# Patient Record
Sex: FEMALE | Race: WHITE | NOT HISPANIC OR LATINO | Employment: OTHER | URBAN - METROPOLITAN AREA
[De-identification: names, ages, dates, MRNs, and addresses within clinical notes are randomized per-mention and may not be internally consistent; named-entity substitution may affect disease eponyms.]

---

## 2017-12-08 ENCOUNTER — OFFICE VISIT (OUTPATIENT)
Dept: FAMILY MEDICINE CLINIC | Facility: CLINIC | Age: 72
End: 2017-12-08

## 2017-12-08 VITALS
OXYGEN SATURATION: 100 % | WEIGHT: 129 LBS | DIASTOLIC BLOOD PRESSURE: 65 MMHG | TEMPERATURE: 97.8 F | BODY MASS INDEX: 21.49 KG/M2 | HEIGHT: 65 IN | SYSTOLIC BLOOD PRESSURE: 140 MMHG | HEART RATE: 69 BPM

## 2017-12-08 DIAGNOSIS — K59.04 CHRONIC IDIOPATHIC CONSTIPATION: Primary | ICD-10-CM

## 2017-12-08 DIAGNOSIS — Z86.19 HISTORY OF HEPATITIS: ICD-10-CM

## 2017-12-08 PROBLEM — K59.00 CONSTIPATION: Status: ACTIVE | Noted: 2017-12-05

## 2017-12-08 PROCEDURE — 99203 OFFICE O/P NEW LOW 30 MIN: CPT | Performed by: FAMILY MEDICINE

## 2017-12-08 RX ORDER — ESTRADIOL AND NORETHINDRONE ACETATE 1; .5 MG/1; MG/1
1 TABLET ORAL
COMMUNITY
End: 2019-03-06 | Stop reason: SDUPTHER

## 2017-12-08 NOTE — PROGRESS NOTES
Subjective   Denisse Marino is a 72 y.o. female. Pt is presenting to [facility]. She was previously seen by Dr. Kirkland who has transitioned to [facility]. Her chief complaint is constipation and history of unknown type of hepatitis.    Chief Complaint   Patient presents with   • Constipation     routine follow up        History of Present Illness    Constipation  Chronic problem for years. She has had colonoscopy without cause. She has hard, dry, pellet like BMs every 3-4 days. Seems to be worse in the winter, feels she is overall more sedentary in the winter and her diet is not as good. Little better in the warm months as she is very active and eats more produce. Has tried Citracel routinely and eating bran cereal. Actually feels like these interventions make it worse. She is conscientious to stay well hydrated with water, does not drink milk. Saw physician and received a sample of Linzess but does not feel this has helped yet. Pt is also not interested in taking prescription pills in general.    History of hepatitis infection at age 11 years.   Pt reported that she was diagnosed with hepatitis at that same time as her brother, and her mother had hepatitis but was much sicker and had to be hospitalized for treatment. Had another sister and brother who were not affected. Pt is unsure of the treatment but does remember having to return to the hospital regularly for blood draws/testing. She thought she had to be on a strict diet to avoid sugars. Does not think that she was vaccinated against Hepatitis but unsure. Not aware if prior PCP tested her for any hepatitis, she did just discuss this history with PCP at most recent visit this August. Was scheduled for follow up labs but this was cancelled because PCP transitioned to [facility] and pt not following. Denied abdominal pain, scleral icterus, abdominal distention/fluid on belly.       The following portions of the patient's history were reviewed and updated as  "appropriate: allergies, current medications, past family history, past medical history, past social history, past surgical history and problem list.      Review of Systems   Constitutional: Negative for activity change, appetite change and unexpected weight change.   HENT: Negative for congestion, rhinorrhea and sinus pressure.    Respiratory: Negative for shortness of breath.    Cardiovascular: Negative for chest pain and leg swelling.   Gastrointestinal: Positive for constipation. Negative for abdominal distention, abdominal pain, blood in stool, diarrhea, nausea and vomiting.   Genitourinary: Negative for difficulty urinating, dysuria, frequency and urgency.   Psychiatric/Behavioral: Negative for dysphoric mood.       Objective   Blood pressure 140/65, pulse 69, temperature 97.8 °F (36.6 °C), height 165.1 cm (65\"), weight 58.5 kg (129 lb), SpO2 100 %.  Physical Exam   Constitutional: She is oriented to person, place, and time. She appears well-nourished. No distress.   HENT:   Right Ear: External ear normal.   Left Ear: External ear normal.   Nose: Nose normal.   Mouth/Throat: Oropharynx is clear and moist. No oropharyngeal exudate.   Bilateral ear canals and tympanic membranes appear normal.   Eyes: Conjunctivae and EOM are normal. Right eye exhibits no discharge. Left eye exhibits no discharge. No scleral icterus.   Neck: Neck supple. No thyromegaly present.   Cardiovascular: Normal rate, regular rhythm, normal heart sounds and intact distal pulses.  Exam reveals no gallop and no friction rub.    No murmur heard.  Pulmonary/Chest: Effort normal and breath sounds normal. No respiratory distress. She has no wheezes. She has no rales.   Abdominal: Soft. Bowel sounds are normal. She exhibits no distension and no mass. There is no tenderness. There is no guarding.   No organomegaly   Musculoskeletal: She exhibits no edema or deformity.   Lymphadenopathy:     She has no cervical adenopathy.   Neurological: She is " alert and oriented to person, place, and time. She exhibits normal muscle tone. Coordination normal.   Skin: Skin is warm and dry.   Psychiatric: She has a normal mood and affect. Her behavior is normal.   Vitals reviewed.      Assessment/Plan   Denisse was seen today for constipation.    Diagnoses and all orders for this visit:    Chronic idiopathic constipation    History of hepatitis    Constipation  Request records from PCP to see when had c-scope and results  Chronic problem  Discussed constipation, colon distention, dysfunction of peristalsis need for chronic treatment of chronic problem  Has not tried Miralax consistently, discussed how to titrate, side effect of diarrhea, plenty of fluids    Hepatitis  Hx concerning for B or C  Will get records from PCP, if no record of testing told pt we would do antibody testing to make sure she has B immunity and no hep C  Overall pt without overt symptoms, hx of infection 61 years ago but will solidify with labs if not done, if she is a carrier possible risk to household members

## 2018-01-17 ENCOUNTER — OFFICE VISIT (OUTPATIENT)
Dept: FAMILY MEDICINE CLINIC | Facility: CLINIC | Age: 73
End: 2018-01-17

## 2018-01-17 VITALS
WEIGHT: 130 LBS | HEART RATE: 93 BPM | BODY MASS INDEX: 21.66 KG/M2 | SYSTOLIC BLOOD PRESSURE: 134 MMHG | OXYGEN SATURATION: 99 % | DIASTOLIC BLOOD PRESSURE: 78 MMHG | HEIGHT: 65 IN

## 2018-01-17 DIAGNOSIS — M54.16 LUMBAR BACK PAIN WITH RADICULOPATHY AFFECTING RIGHT LOWER EXTREMITY: Primary | ICD-10-CM

## 2018-01-17 PROCEDURE — 99213 OFFICE O/P EST LOW 20 MIN: CPT | Performed by: NURSE PRACTITIONER

## 2018-01-17 RX ORDER — METHYLPREDNISOLONE 4 MG/1
TABLET ORAL
Qty: 21 TABLET | Refills: 0 | Status: SHIPPED | OUTPATIENT
Start: 2018-01-17 | End: 2018-01-22 | Stop reason: SDUPTHER

## 2018-01-17 NOTE — PATIENT INSTRUCTIONS
Sciatica  Sciatica is pain, numbness, weakness, or tingling along the path of the sciatic nerve. The sciatic nerve starts in the lower back and runs down the back of each leg. The nerve controls the muscles in the lower leg and in the back of the knee. It also provides feeling (sensation) to the back of the thigh, the lower leg, and the sole of the foot. Sciatica is a symptom of another medical condition that pinches or puts pressure on the sciatic nerve.  Generally, sciatica only affects one side of the body. Sciatica usually goes away on its own or with treatment. In some cases, sciatica may keep coming back (recur).  What are the causes?  This condition is caused by pressure on the sciatic nerve, or pinching of the sciatic nerve. This may be the result of:  · A disk in between the bones of the spine (vertebrae) bulging out too far (herniated disk).  · Age-related changes in the spinal disks (degenerative disk disease).  · A pain disorder that affects a muscle in the buttock (piriformis syndrome).  · Extra bone growth (bone spur) near the sciatic nerve.  · An injury or break (fracture) of the pelvis.  · Pregnancy.  · Tumor (rare).  What increases the risk?  The following factors may make you more likely to develop this condition:  · Playing sports that place pressure or stress on the spine, such as football or weight lifting.  · Having poor strength and flexibility.  · A history of back injury.  · A history of back surgery.  · Sitting for long periods of time.  · Doing activities that involve repetitive bending or lifting.  · Obesity.  What are the signs or symptoms?  Symptoms can vary from mild to very severe, and they may include:  · Any of these problems in the lower back, leg, hip, or buttock:  ¨ Mild tingling or dull aches.  ¨ Burning sensations.  ¨ Sharp pains.  · Numbness in the back of the calf or the sole of the foot.  · Leg weakness.  · Severe back pain that makes movement difficult.  These symptoms may  get worse when you cough, sneeze, or laugh, or when you sit or stand for long periods of time. Being overweight may also make symptoms worse. In some cases, symptoms may recur over time.  How is this diagnosed?  This condition may be diagnosed based on:  · Your symptoms.  · A physical exam. Your health care provider may ask you to do certain movements to check whether those movements trigger your symptoms.  · You may have tests, including:  ¨ Blood tests.  ¨ X-rays.  ¨ MRI.  ¨ CT scan.  How is this treated?  In many cases, this condition improves on its own, without any treatment. However, treatment may include:  · Reducing or modifying physical activity during periods of pain.  · Exercising and stretching to strengthen your abdomen and improve the flexibility of your spine.  · Icing and applying heat to the affected area.  · Medicines that help:  ¨ To relieve pain and swelling.  ¨ To relax your muscles.  · Injections of medicines that help to relieve pain, irritation, and inflammation around the sciatic nerve (steroids).  · Surgery.  Follow these instructions at home:  Medicines  · Take over-the-counter and prescription medicines only as told by your health care provider.  · Do not drive or operate heavy machinery while taking prescription pain medicine.  Managing pain  · If directed, apply ice to the affected area.  ¨ Put ice in a plastic bag.  ¨ Place a towel between your skin and the bag.  ¨ Leave the ice on for 20 minutes, 2-3 times a day.  · After icing, apply heat to the affected area before you exercise or as often as told by your health care provider. Use the heat source that your health care provider recommends, such as a moist heat pack or a heating pad.  ¨ Place a towel between your skin and the heat source.  ¨ Leave the heat on for 20-30 minutes.  ¨ Remove the heat if your skin turns bright red. This is especially important if you are unable to feel pain, heat, or cold. You may have a greater risk of  getting burned.  Activity  · Return to your normal activities as told by your health care provider. Ask your health care provider what activities are safe for you.  ¨ Avoid activities that make your symptoms worse.  · Take brief periods of rest throughout the day. Resting in a lying or standing position is usually better than sitting to rest.  ¨ When you rest for longer periods, mix in some mild activity or stretching between periods of rest. This will help to prevent stiffness and pain.  ¨ Avoid sitting for long periods of time without moving. Get up and move around at least one time each hour.  · Exercise and stretch regularly, as told by your health care provider.  · Do not lift anything that is heavier than 10 lb (4.5 kg) while you have symptoms of sciatica. When you do not have symptoms, you should still avoid heavy lifting, especially repetitive heavy lifting.  · When you lift objects, always use proper lifting technique, which includes:  ¨ Bending your knees.  ¨ Keeping the load close to your body.  ¨ Avoiding twisting.  General instructions  · Use good posture.  ¨ Avoid leaning forward while sitting.  ¨ Avoid hunching over while standing.  · Maintain a healthy weight. Excess weight puts extra stress on your back and makes it difficult to maintain good posture.  · Wear supportive, comfortable shoes. Avoid wearing high heels.  · Avoid sleeping on a mattress that is too soft or too hard. A mattress that is firm enough to support your back when you sleep may help to reduce your pain.  · Keep all follow-up visits as told by your health care provider. This is important.  Contact a health care provider if:  · You have pain that wakes you up when you are sleeping.  · You have pain that gets worse when you lie down.  · Your pain is worse than you have experienced in the past.  · Your pain lasts longer than 4 weeks.  · You experience unexplained weight loss.  Get help right away if:  · You lose control of your bowel  or bladder (incontinence).  · You have:  ¨ Weakness in your lower back, pelvis, buttocks, or legs that gets worse.  ¨ Redness or swelling of your back.  ¨ A burning sensation when you urinate.  This information is not intended to replace advice given to you by your health care provider. Make sure you discuss any questions you have with your health care provider.  Document Released: 12/12/2002 Document Revised: 05/23/2017 Document Reviewed: 08/26/2016  Elsevier Interactive Patient Education © 2017 [employer]

## 2018-01-17 NOTE — PROGRESS NOTES
Subjective   Denisse Marino is a 72 y.o. female presents with two day history of lower back pain on right side radiating into buttock and right thigh. States right thigh is numb. Continuously numb with no improvement despite positional changes. Pain is currently 6/10, last night 8/10. Had previous pain in lower back, treated with steroid and noticed improvement. No known injury    Back Pain   This is a new problem. The current episode started in the past 7 days. The problem occurs daily. The problem has been waxing and waning since onset. The pain is present in the lumbar spine and gluteal. The quality of the pain is described as aching. The pain radiates to the right thigh. The pain is at a severity of 6/10. The pain is moderate. The pain is worse during the day. The symptoms are aggravated by sitting. Stiffness is present in the morning. Associated symptoms include leg pain (numbness). Pertinent negatives include no abdominal pain, bladder incontinence, bowel incontinence, chest pain, dysuria, fever, headaches, numbness, paresis, paresthesias, pelvic pain, perianal numbness, tingling, weakness or weight loss. She has tried NSAIDs and analgesics for the symptoms. The treatment provided mild relief.        The following portions of the patient's history were reviewed and updated as appropriate: allergies, current medications, past family history, past medical history, past social history, past surgical history and problem list.    Review of Systems   Constitutional: Positive for activity change (decreased secondary to back pain). Negative for fever and weight loss.   Cardiovascular: Negative for chest pain.   Gastrointestinal: Negative for abdominal pain and bowel incontinence.   Genitourinary: Negative for bladder incontinence, dysuria and pelvic pain.   Musculoskeletal: Positive for back pain (lower back pain/buttock, right sided, radiating into right thigh, ). Negative for gait problem, joint swelling,  myalgias, neck pain and neck stiffness.   Neurological: Negative for tingling, weakness, numbness, headaches and paresthesias.       Objective   Physical Exam   Constitutional: She is oriented to person, place, and time. She appears well-developed and well-nourished.   Cardiovascular: Normal rate, regular rhythm and normal heart sounds.  Exam reveals no gallop and no friction rub.    No murmur heard.  Pulmonary/Chest: Effort normal and breath sounds normal. No respiratory distress. She has no wheezes. She has no rales.   Musculoskeletal:        Lumbar back: She exhibits normal range of motion, no tenderness, no bony tenderness, no swelling, no edema, no deformity, no laceration, no pain and no spasm.   Denies pain with palpation, no erythema or edema, numbness noted in right thigh, does not extend to knee     Neurological: She is alert and oriented to person, place, and time.   Skin: Skin is warm and dry.   Psychiatric: She has a normal mood and affect.   Vitals reviewed.      Assessment/Plan   Denisse was seen today for back pain.    Diagnoses and all orders for this visit:    Lumbar back pain with radiculopathy affecting right lower extremity  -     Ambulatory Referral to Physical Therapy Evaluate and treat    Other orders  -     MethylPREDNISolone (MEDROL, HALEIGH,) 4 MG tablet; Take as directed on package instructions.        For continued symptoms, will likely need CT lumbar spine. Patient agreeable with recommendations.  Will start steroid and try physical therapy to relieve symptoms.

## 2018-01-18 ENCOUNTER — TELEPHONE (OUTPATIENT)
Dept: FAMILY MEDICINE CLINIC | Facility: CLINIC | Age: 73
End: 2018-01-18

## 2018-01-18 NOTE — TELEPHONE ENCOUNTER
Yi from physical therapy called and they need an order for pt. She has an appointment tomorrow. Their fax number [phone].

## 2018-01-22 ENCOUNTER — TELEPHONE (OUTPATIENT)
Dept: FAMILY MEDICINE CLINIC | Facility: CLINIC | Age: 73
End: 2018-01-22

## 2018-01-22 RX ORDER — METHYLPREDNISOLONE 4 MG/1
TABLET ORAL
Qty: 21 TABLET | Refills: 0 | Status: SHIPPED | OUTPATIENT
Start: 2018-01-22 | End: 2018-01-26

## 2018-01-22 NOTE — TELEPHONE ENCOUNTER
Pt called and said that she saw you on the 17th and you prescribed her a medrol dose meghna. She said that she is on her last day of the dose pack and said that she is in a lot of pain. She said that the dose meghna helped for a little while and now her is hurting again. She is doing physical therapy. She wasn't sure if she needs to see you again or if we need to refill her dose pack?

## 2018-01-23 ENCOUNTER — TELEPHONE (OUTPATIENT)
Dept: FAMILY MEDICINE CLINIC | Facility: CLINIC | Age: 73
End: 2018-01-23

## 2018-01-23 NOTE — TELEPHONE ENCOUNTER
[facility] called wanting to speak to a pysician in regards to the patient's well being.

## 2018-01-24 ENCOUNTER — TELEPHONE (OUTPATIENT)
Dept: FAMILY MEDICINE CLINIC | Facility: CLINIC | Age: 73
End: 2018-01-24

## 2018-01-24 NOTE — TELEPHONE ENCOUNTER
Patient called stating that her physical therapist Carlo Nunez called yesterday to discuss her pain and possibly get an order for an MRI.The patient is still in pain

## 2018-01-25 ENCOUNTER — TELEPHONE (OUTPATIENT)
Dept: FAMILY MEDICINE CLINIC | Facility: CLINIC | Age: 73
End: 2018-01-25

## 2018-01-25 NOTE — TELEPHONE ENCOUNTER
Spoke with patient's physical therapist who has concern about her persistent radicular symptoms.  She presented with SI joint symptoms and sciatica.  However they have worked very hard on her SI symptoms which have improved.  He reported that her turn out is better in the SI symptoms resolved however the radicular symptoms are persisting and go to the knee.  She describes deadness and tingling.  He reports that her reflexes are still intact and normal and she is not having any weakness.  She has had no imaging in her chart to date.  Would be appropriate at this time to call the patient in for L-spine x-ray and from there likely MRI.

## 2018-01-26 ENCOUNTER — OFFICE VISIT (OUTPATIENT)
Dept: SPORTS MEDICINE | Facility: CLINIC | Age: 73
End: 2018-01-26

## 2018-01-26 ENCOUNTER — TELEPHONE (OUTPATIENT)
Dept: SPORTS MEDICINE | Facility: CLINIC | Age: 73
End: 2018-01-26

## 2018-01-26 VITALS
WEIGHT: 128 LBS | HEIGHT: 65 IN | DIASTOLIC BLOOD PRESSURE: 68 MMHG | SYSTOLIC BLOOD PRESSURE: 116 MMHG | BODY MASS INDEX: 21.33 KG/M2

## 2018-01-26 DIAGNOSIS — M54.41 ACUTE RIGHT-SIDED LOW BACK PAIN WITH RIGHT-SIDED SCIATICA: Primary | ICD-10-CM

## 2018-01-26 DIAGNOSIS — M25.551 PAIN OF RIGHT HIP JOINT: ICD-10-CM

## 2018-01-26 DIAGNOSIS — M54.16 RIGHT LUMBAR RADICULITIS: ICD-10-CM

## 2018-01-26 PROCEDURE — 99214 OFFICE O/P EST MOD 30 MIN: CPT | Performed by: FAMILY MEDICINE

## 2018-01-26 PROCEDURE — 72170 X-RAY EXAM OF PELVIS: CPT | Performed by: FAMILY MEDICINE

## 2018-01-26 PROCEDURE — 72100 X-RAY EXAM L-S SPINE 2/3 VWS: CPT | Performed by: FAMILY MEDICINE

## 2018-01-26 RX ORDER — GABAPENTIN 100 MG/1
CAPSULE ORAL
Qty: 60 CAPSULE | Refills: 1 | Status: SHIPPED | OUTPATIENT
Start: 2018-01-26 | End: 2018-01-31 | Stop reason: SDUPTHER

## 2018-01-26 RX ORDER — PREDNISONE 20 MG/1
TABLET ORAL
Qty: 20 TABLET | Refills: 0 | Status: SHIPPED | OUTPATIENT
Start: 2018-01-26 | End: 2018-02-21

## 2018-01-26 NOTE — TELEPHONE ENCOUNTER
CALLED IN GABAPENTIN TO [facility] IN [facility]

## 2018-01-26 NOTE — PROGRESS NOTES
"Denisse is a 72 y.o. year old female    Chief Complaint   Patient presents with   • Back Pain     Right side x1 week   • Leg Pain       History of Present Illness  HPI   Here today for low back pain, referred by Dr. Bocanegra. Started about 2 weeks ago with tingling in the right leg, but then worsened to include LBP and numbness/pain in the right thigh. Started physical therapy and medrol pack (helped while she was taking it). PT TIW, actually worsening. No significant past back issues. Moderately severe.    I have reviewed the patient's medical, family, and social history in detail and updated the computerized patient record.    Review of Systems   Constitutional: Negative for fever.   Skin: Negative for wound.   Neurological: Positive for numbness. Negative for weakness.   All other systems reviewed and are negative.      /68  Ht 165.1 cm (65\")  Wt 58.1 kg (128 lb)  BMI 21.3 kg/m2     Physical Exam    Vital signs reviewed.   General: No acute distress.  Eyes: conjunctiva clear; pupils equally round and reactive  ENT: external ears and nose atraumatic; oropharynx clear  CV: no peripheral edema, 2+ distal pulses  Resp: normal respiratory effort, no use of accessory muscles  Skin: no rashes or wounds; normal turgor  Psych: mood and affect appropriate; recent and remote memory intact  Neuro: sensation to light touch intact    MSK Exam:  Ortho Exam  Lumbar spine: Normal appearance.  Generally restricted range of motion.  Tenderness to palpation right lumbar paraspinals around L4.  Equivocal right-sided straight leg raise.  Negative slump.  Symmetric sensation and reflexes in both legs.  Right hip has normal range of motion.    Lumbar Spine X-Ray  Indication: Pain  Views: AP and Lateral    Findings:  No fracture  No bony lesion  Normal soft tissues  Very mild degenerative changes    No prior studies were available for comparison.    Pelvis X-Ray  Indication: Pain  AP and Frogleg views    Findings:  No fracture  No " bony lesion  Normal soft tissues  Normal joint spaces    No prior studies were available for comparison.      Diagnoses and all orders for this visit:    Acute right-sided low back pain with right-sided sciatica  -     XR Spine Lumbar 2 or 3 View  -     MRI Lumbar Spine Without Contrast; Future    Pain of right hip joint  -     XR Pelvis 1 or 2 View    Right lumbar radiculitis  -     MRI Lumbar Spine Without Contrast; Future    Other orders  -     predniSONE (DELTASONE) 20 MG tablet; Take 3 tabs daily for 3 days, then 2 daily for 3 days, then 1 daily for 5 days  -     gabapentin (NEURONTIN) 100 MG capsule; Start with one cap at bedtime, may increase by 1 cap daily up to 3 caps TID prn nerve pain    Clinically I'm concerned for lumbar nerve root entrapment.  Given her worsening despite conservative measures I think an MRI is now indicated.  We are going to try sample of Vimovo for pain relief, but will also prescribe repeat steroid taper if necessary for pain relief.  Discussed appropriate use of gabapentin for neurogenic pain.  We'll follow-up based on her MRI result.    EMR Dragon/Transcription disclaimer:    Much of this encounter note is an electronic transcription/translation of spoken language to printed text.  The electronic translation of spoken language may permit erroneous, or at times, nonsensical words or phrases to be inadvertently transcribed.  Although I have reviewed the note for such errors some may still exist.

## 2018-01-30 DIAGNOSIS — M54.41 ACUTE RIGHT-SIDED LOW BACK PAIN WITH RIGHT-SIDED SCIATICA: ICD-10-CM

## 2018-01-30 DIAGNOSIS — M54.16 RIGHT LUMBAR RADICULITIS: ICD-10-CM

## 2018-01-31 ENCOUNTER — TELEPHONE (OUTPATIENT)
Dept: SPORTS MEDICINE | Facility: CLINIC | Age: 73
End: 2018-01-31

## 2018-01-31 RX ORDER — NAPROXEN 500 MG/1
500 TABLET ORAL 2 TIMES DAILY WITH MEALS
Qty: 60 TABLET | Refills: 2 | Status: SHIPPED | OUTPATIENT
Start: 2018-01-31 | End: 2018-02-21

## 2018-01-31 RX ORDER — GABAPENTIN 100 MG/1
300 CAPSULE ORAL 3 TIMES DAILY
Qty: 90 CAPSULE | Refills: 1 | Status: SHIPPED | OUTPATIENT
Start: 2018-01-31 | End: 2018-02-21 | Stop reason: SDUPTHER

## 2018-01-31 NOTE — TELEPHONE ENCOUNTER
----- Message from Jonathan Stewart MD sent at 1/31/2018  2:53 PM EST -----  MRI confirms suspected disc bulge in the low back.  Many of these do respond to conservative treatment without surgery, which will certainly be our first attempt.  For the time being I would recommend we continue medications and physical therapy.  Upon return from your vacation, let's follow-up and see how things are doing.  If things do not continue to progress forward, we may need to consider an epidural injection or consultation with a neurosurgeon.    Please let me know if you have been able to get decent pain relief with the medications we started when you were here in the office.

## 2018-01-31 NOTE — TELEPHONE ENCOUNTER
Discussed MRI results in detail. Confirmed disc bulge with nerve impingement. Symptoms are improving with vimovo and gabapentin without side effects. Will continue the same plan for now, resume PT upon return from cruise, and f/u with me after that as well.

## 2018-02-21 ENCOUNTER — OFFICE VISIT (OUTPATIENT)
Dept: SPORTS MEDICINE | Facility: CLINIC | Age: 73
End: 2018-02-21

## 2018-02-21 VITALS
BODY MASS INDEX: 21.83 KG/M2 | SYSTOLIC BLOOD PRESSURE: 114 MMHG | HEIGHT: 65 IN | DIASTOLIC BLOOD PRESSURE: 72 MMHG | WEIGHT: 131 LBS

## 2018-02-21 DIAGNOSIS — M51.36 BULGING LUMBAR DISC: Primary | ICD-10-CM

## 2018-02-21 PROCEDURE — 99213 OFFICE O/P EST LOW 20 MIN: CPT | Performed by: FAMILY MEDICINE

## 2018-02-21 RX ORDER — GABAPENTIN 100 MG/1
100 CAPSULE ORAL 3 TIMES DAILY
Qty: 90 CAPSULE | Refills: 1 | Status: SHIPPED | OUTPATIENT
Start: 2018-02-21 | End: 2018-10-04 | Stop reason: ALTCHOICE

## 2018-02-21 NOTE — PROGRESS NOTES
"Denisse is a 72 y.o. year old female    Chief Complaint   Patient presents with   • Back Pain       History of Present Illness  HPI   F/u lumbar disc bulge - numbness resolved but still has some mild hypersensitivity in the R thigh. Using 100-300mg gabapentin (usually 100mg TID) for pain relief. Did use prednisone while on her trip which was very helpful. Continues to have mild, constant, dull pain in the central aspect of the lower back.     I have reviewed the patient's medical, family, and social history in detail and updated the computerized patient record.    Review of Systems   Constitutional: Negative.    Musculoskeletal: Positive for back pain.   Neurological: Negative for weakness and numbness.       /72  Ht 165.1 cm (65\")  Wt 59.4 kg (131 lb)  BMI 21.8 kg/m2     Physical Exam    Vital signs reviewed.   General: No acute distress.  Eyes: conjunctiva clear; pupils equally round and reactive  ENT: external ears and nose atraumatic; oropharynx clear  CV: no peripheral edema, 2+ distal pulses  Resp: normal respiratory effort, no use of accessory muscles  Skin: no rashes or wounds; normal turgor  Psych: mood and affect appropriate; recent and remote memory intact  Neuro: sensation to light touch intact    MSK Exam:  Ortho Exam  Lumbar spine: Normal appearance.  Normal range of motion and strength.  Negative straight leg raise/slump.  Notes abnormal sensation in the right thigh, but intact.      Diagnoses and all orders for this visit:    Bulging lumbar disc  -     gabapentin (NEURONTIN) 100 MG capsule; Take 1 capsule by mouth 3 (Three) Times a Day.  -     Ambulatory Referral to Physical Therapy Evaluate and treat      She is progressing well.  Discussed continuing conservative treatment of her disc bulge with radiculitis.  We'll continue physical therapy, NSAIDs as needed, low-dose gabapentin as needed for pain.  Recheck her progress in about 4 weeks.    EMR Dragon/Transcription disclaimer:    Much of " this encounter note is an electronic transcription/translation of spoken language to printed text.  The electronic translation of spoken language may permit erroneous, or at times, nonsensical words or phrases to be inadvertently transcribed.  Although I have reviewed the note for such errors some may still exist.

## 2018-02-22 ENCOUNTER — TELEPHONE (OUTPATIENT)
Dept: SPORTS MEDICINE | Facility: CLINIC | Age: 73
End: 2018-02-22

## 2018-02-22 NOTE — TELEPHONE ENCOUNTER
Did vimovo mean to go to [facility] or did it need to go to the specialty pharmacy?

## 2018-03-20 ENCOUNTER — OFFICE VISIT (OUTPATIENT)
Dept: SPORTS MEDICINE | Facility: CLINIC | Age: 73
End: 2018-03-20

## 2018-03-20 VITALS
BODY MASS INDEX: 21.99 KG/M2 | WEIGHT: 132 LBS | DIASTOLIC BLOOD PRESSURE: 72 MMHG | HEIGHT: 65 IN | SYSTOLIC BLOOD PRESSURE: 140 MMHG

## 2018-03-20 DIAGNOSIS — M51.36 BULGING LUMBAR DISC: Primary | ICD-10-CM

## 2018-03-20 PROCEDURE — 99213 OFFICE O/P EST LOW 20 MIN: CPT | Performed by: FAMILY MEDICINE

## 2018-03-20 RX ORDER — NAPROXEN 500 MG/1
500 TABLET ORAL 2 TIMES DAILY WITH MEALS
COMMUNITY
End: 2018-09-11

## 2018-03-20 NOTE — PROGRESS NOTES
"Denisse is a 72 y.o. year old female    Chief Complaint   Patient presents with   • Follow-up     back pain       History of Present Illness  HPI   F/u lumbar disc bulge with R radicular pain. Did PT through last week; overall still much better but recently having some more burning/tingling in the R thigh. Worse in seated position, better with standing. Had tapered down to gabapentin 100mg qhs only, in the past 2 weeks with pain flaring up has gone back to 2-3 times a day, also resumed naproxen.     I have reviewed the patient's medical, family, and social history in detail and updated the computerized patient record.    Review of Systems   Constitutional: Negative for fever.   Neurological: Negative for weakness.       /72   Ht 165 cm (64.96\")   Wt 59.9 kg (132 lb)   BMI 21.99 kg/m²      Physical Exam    Vital signs reviewed.   General: No acute distress.  Eyes: conjunctiva clear; pupils equally round and reactive  ENT: external ears and nose atraumatic; oropharynx clear  CV: no peripheral edema, 2+ distal pulses  Resp: normal respiratory effort, no use of accessory muscles  Skin: no rashes or wounds; normal turgor  Psych: mood and affect appropriate; recent and remote memory intact  Neuro: sensation to light touch intact    [facility] Exam:  Ortho Exam        Diagnoses and all orders for this visit:    Bulging lumbar disc    Other orders  -     naproxen (NAPROSYN) 500 MG tablet; Take 500 mg by mouth 2 (Two) Times a Day With Meals.      She is progressing well.  Discussed long-term expectations, she should continue to progress well with conservative, nonsurgical management.  If she fails to continue to improve within the next step would be consideration of a lumbar epidural injection.  She has failure to continue to improve or any worsening pain, she can contact me and we can work on arranging that.  Otherwise will plan to check on her progress in about 6 months.    EMR Dragon/Transcription disclaimer:    Much " of this encounter note is an electronic transcription/translation of spoken language to printed text.  The electronic translation of spoken language may permit erroneous, or at times, nonsensical words or phrases to be inadvertently transcribed.  Although I have reviewed the note for such errors some may still exist.

## 2018-05-01 RX ORDER — PREDNISONE 20 MG/1
TABLET ORAL
Qty: 18 TABLET | Refills: 0 | Status: SHIPPED | OUTPATIENT
Start: 2018-05-01 | End: 2018-07-16

## 2018-07-16 ENCOUNTER — OFFICE VISIT (OUTPATIENT)
Dept: SPORTS MEDICINE | Facility: CLINIC | Age: 73
End: 2018-07-16

## 2018-07-16 VITALS
BODY MASS INDEX: 22.53 KG/M2 | WEIGHT: 132 LBS | SYSTOLIC BLOOD PRESSURE: 116 MMHG | DIASTOLIC BLOOD PRESSURE: 72 MMHG | HEIGHT: 64 IN

## 2018-07-16 DIAGNOSIS — M54.16 RIGHT LUMBAR RADICULITIS: ICD-10-CM

## 2018-07-16 DIAGNOSIS — M51.36 BULGING LUMBAR DISC: Primary | ICD-10-CM

## 2018-07-16 PROCEDURE — 99213 OFFICE O/P EST LOW 20 MIN: CPT | Performed by: FAMILY MEDICINE

## 2018-07-16 NOTE — PROGRESS NOTES
"Denisse is a 73 y.o. year old female    Chief Complaint   Patient presents with   • Back Pain     Pt is here to f/u       History of Present Illness  HPI   Here to follow-up on lumbar disc herniation with right-sided radicular symptoms.  She is now about 6 months out from her onset of symptoms.  she continues to have rather constant abnormal sensation in the right thigh, with intermittent exacerbations.  Typically worse with sitting for long periods of time.  Intermittently using gabapentin with decent relief, sparingly typically at bedtime.    I have reviewed the patient's medical, family, and social history in detail and updated the computerized patient record.    Review of Systems   Constitutional: Negative.    Neurological: Negative for weakness.       /72   Ht 162.6 cm (64\")   Wt 59.9 kg (132 lb)   BMI 22.66 kg/m²      Physical Exam    Vital signs reviewed.   General: No acute distress.  Eyes: conjunctiva clear; pupils equally round and reactive  ENT: external ears and nose atraumatic; oropharynx clear  CV: no peripheral edema, 2+ distal pulses  Resp: normal respiratory effort, no use of accessory muscles  Skin: no rashes or wounds; normal turgor  Psych: mood and affect appropriate; recent and remote memory intact    [facility] Exam:  Ortho Exam  Right thigh abnormal sensation to light touch but intact.  Negative slumpon the right side.      Diagnoses and all orders for this visit:    Bulging lumbar disc  -     Ambulatory Referral to Neurosurgery    Right lumbar radiculitis  -     Ambulatory Referral to Neurosurgery      We had a long discussion regarding treatment options at this time.  We agree that consultation with neurosurgery would be appropriate to consider definitive surgical treatment, presumably microdiscectomy, versus trial of epidurals.  She can follow up with me if needed.    I spent greater than 50% of this 15 minute visit discussing the diagnosis, prognosis, treatment plan, etc. Patient's " questions were answered in detail with appropriate counseling and anticipatory guidance.

## 2018-07-26 ENCOUNTER — HOSPITAL ENCOUNTER (OUTPATIENT)
Dept: GENERAL RADIOLOGY | Facility: HOSPITAL | Age: 73
Discharge: HOME | End: 2018-07-26
Admitting: NURSE PRACTITIONER

## 2018-07-26 ENCOUNTER — OFFICE VISIT (OUTPATIENT)
Dept: NEUROSURGERY | Facility: CLINIC | Age: 73
End: 2018-07-26

## 2018-07-26 VITALS
HEART RATE: 82 BPM | BODY MASS INDEX: 21.83 KG/M2 | SYSTOLIC BLOOD PRESSURE: 136 MMHG | WEIGHT: 131 LBS | RESPIRATION RATE: 18 BRPM | HEIGHT: 65 IN | DIASTOLIC BLOOD PRESSURE: 80 MMHG

## 2018-07-26 DIAGNOSIS — M54.16 LUMBAR RADICULOPATHY: Primary | ICD-10-CM

## 2018-07-26 DIAGNOSIS — M54.16 LUMBAR RADICULOPATHY: ICD-10-CM

## 2018-07-26 PROCEDURE — 99214 OFFICE O/P EST MOD 30 MIN: CPT | Performed by: NURSE PRACTITIONER

## 2018-07-26 PROCEDURE — 72114 X-RAY EXAM L-S SPINE BENDING: CPT

## 2018-07-26 NOTE — PROGRESS NOTES
"Subjective   Patient ID: Denisse Marino is a 73 y.o. female is being seen for consultation today at the request of Jonathan Stewart MD for back pain. Patient presents unaccompanied.     History of Present Illness     She presents to the office today at the request of Dr. Stewart for back and right leg pain.  She had right leg pain that started in January without precipitating cause her injury.  Initially, the pain was severe and radiated from the right hip medially to the right inner thigh and down to the knee.  Pain does not go below the right knee.  She was given steroids and started physical therapy, both of which have helped to alleviate most of the right thigh pain.  However, she still has constant numbness with intermittent tingling in the same distribution where the pain was.  The right anterior thigh remains numb and this has not improved with physical therapy.    Recently, she began going to a chiropractor which did not help with the right leg issues and actually worsened the low back pain.  She started taking gabapentin when the pain initially started in January and now only takes it on an as-needed basis.  She denies any left leg pain.  She also has no weakness in either leg.  She continues to walk 1-1/2 miles daily.  Low back pain is bilateral, right greater than left and now is now worse with bending and twisting at the waist.  She denies any groin paresthesias or bowel or bladder incontinence.    She presents unaccompanied.    /80 (BP Location: Left arm, Patient Position: Sitting)   Pulse 82   Resp 18   Ht 165.1 cm (65\")   Wt 59.4 kg (131 lb)   BMI 21.80 kg/m²       The following portions of the patient's history were reviewed and updated as appropriate: allergies, current medications, past family history, past medical history, past social history, past surgical history and problem list.    Review of Systems   Constitutional: Negative for chills and fever.   HENT: Negative for tinnitus.  "   Eyes: Negative for visual disturbance.   Respiratory: Negative for cough, shortness of breath and wheezing.    Cardiovascular: Negative for chest pain and palpitations.   Gastrointestinal: Negative for abdominal pain, nausea and vomiting.   Genitourinary: Negative for difficulty urinating and enuresis.   Musculoskeletal: Positive for back pain.        RLE pain     Skin: Negative for rash.   Neurological: Positive for numbness (RLE). Negative for weakness.   Psychiatric/Behavioral: Negative for sleep disturbance.       Objective   Physical Exam   Constitutional: She is oriented to person, place, and time. She appears well-developed and well-nourished. She is cooperative.   Very pleasant, well-appearing older female, thin, looks much younger than her stated age   HENT:   Head: Normocephalic.   Eyes:   Corrective lenses   Neck: Neck supple. No tracheal deviation present.   Cardiovascular: Intact distal pulses.    Pulmonary/Chest: Effort normal and breath sounds normal.   Abdominal: Soft.   Musculoskeletal: Normal range of motion. She exhibits tenderness (Tender to minimal palpation bilateral paraspinal musculature L4 5). She exhibits no deformity.        Lumbar back: She exhibits tenderness and pain. She exhibits normal range of motion and no bony tenderness.   Full lumbar range of motion without pain  Strength equal and normal bilateral lower extremities with normal muscle bulk and tone   Neurological: She is alert and oriented to person, place, and time. She has normal strength. She displays no tremor and normal reflexes. A sensory deficit is present. No cranial nerve deficit. Coordination and gait normal. GCS eye subscore is 4. GCS verbal subscore is 5. GCS motor subscore is 6.   Reflex Scores:       Patellar reflexes are 2+ on the right side and 2+ on the left side.       Achilles reflexes are 2+ on the right side and 2+ on the left side.  Decreased sensory right anterior and medial thigh to soft touch,  temperature and pinprick.  Negative straight leg raise bilaterally  Negative clonus  Gait is stable upright and fluid  Able to heel and toe walk, able to tandem   Skin: Skin is warm and dry.   Psychiatric: She has a normal mood and affect. Her behavior is normal. Thought content normal.   Vitals reviewed.    Neurologic Exam     Mental Status   Oriented to person, place, and time.     Motor Exam     Strength   Strength 5/5 throughout.     Gait, Coordination, and Reflexes     Reflexes   Right patellar: 2+  Left patellar: 2+  Right achilles: 2+  Left achilles: 2+      Assessment/Plan   Independent Review of Radiographic Studies:    MRI lumbar spine without contrast reveals L2-3 soft tissue abnormalities to the right that extend into neuroforamen effacing both the L2 and L3 nerve roots.  She also has L4 5 mild to moderate neuroforaminal encroachment with mild L4 nerve root effacement bilaterally      Medical Decision Making:    She presents the office today with 7 months of right thigh pain with numbness and tingling.  Exam as noted above, no red flags.  The pain has mostly resolved in the right anterior and medial thigh but will return with prolonged walking and activity.  However, numbness with tingling is constant.  This has not change in 7 months since the right leg started hurting in January.  She has no myelopathic findings on exam.  Her gait is stable.  She does have increased low back pain that this started after recent chiropractor adjustments and stretching.  It appears to be more muscular on exam.  I explained that stretching exercises would help with this, in addition to heat and ice.  She is taking over-the-counter anti-inflammatories which she should continue on an as-needed basis.  I explained that the gabapentin likely is not helping taking it only as needed as it needs to build a therapeutic level in the blood before it will really help.  We will plan on seeing her back in the office once the repeat  lumbar MRI with and without contrast as well as a lumbar x-rays are complete.    Plan: Return to office following imaging studies to see Dr. Cookie Salcedo was seen today for back pain.    Diagnoses and all orders for this visit:    Lumbar radiculopathy  -     MRI Lumbar Spine With & Without Contrast; Future  -     XR Spine Lumbar Complete With Flex & Ext; Future      Return in about 4 weeks (around 8/23/2018).

## 2018-09-11 ENCOUNTER — OFFICE VISIT (OUTPATIENT)
Dept: NEUROSURGERY | Facility: CLINIC | Age: 73
End: 2018-09-11

## 2018-09-11 ENCOUNTER — TELEPHONE (OUTPATIENT)
Dept: SPORTS MEDICINE | Facility: CLINIC | Age: 73
End: 2018-09-11

## 2018-09-11 VITALS
HEART RATE: 68 BPM | DIASTOLIC BLOOD PRESSURE: 76 MMHG | SYSTOLIC BLOOD PRESSURE: 120 MMHG | RESPIRATION RATE: 16 BRPM | BODY MASS INDEX: 21.63 KG/M2 | WEIGHT: 130 LBS

## 2018-09-11 DIAGNOSIS — M54.41 CHRONIC RIGHT-SIDED LOW BACK PAIN WITH RIGHT-SIDED SCIATICA: Primary | ICD-10-CM

## 2018-09-11 DIAGNOSIS — G89.29 CHRONIC RIGHT-SIDED LOW BACK PAIN WITH RIGHT-SIDED SCIATICA: Primary | ICD-10-CM

## 2018-09-11 DIAGNOSIS — M51.36 DDD (DEGENERATIVE DISC DISEASE), LUMBAR: ICD-10-CM

## 2018-09-11 PROBLEM — M51.369 DDD (DEGENERATIVE DISC DISEASE), LUMBAR: Status: ACTIVE | Noted: 2018-09-11

## 2018-09-11 PROBLEM — M54.50 CHRONIC LOW BACK PAIN: Status: ACTIVE | Noted: 2018-09-11

## 2018-09-11 PROBLEM — M54.16 LUMBAR RADICULOPATHY: Status: RESOLVED | Noted: 2018-07-26 | Resolved: 2018-09-11

## 2018-09-11 PROCEDURE — 99214 OFFICE O/P EST MOD 30 MIN: CPT | Performed by: NEUROLOGICAL SURGERY

## 2018-09-11 RX ORDER — NAPROXEN SODIUM 220 MG
220 TABLET ORAL AS NEEDED
COMMUNITY
End: 2018-10-04 | Stop reason: ALTCHOICE

## 2018-09-11 NOTE — PROGRESS NOTES
Subjective   Patient ID: Denisse Marino is a 73 y.o. female is here today for consultation for back pain with numbness/tingling in right anterior thigh. She has undergone a new MRI lumbar and presents unaccompanied.    Back Pain   This is a chronic problem. The current episode started more than 1 month ago. The problem occurs constantly. The problem has been waxing and waning since onset. The pain is present in the sacro-iliac. The quality of the pain is described as aching. The pain radiates to the right thigh. The pain is at a severity of 3/10. The pain is worse during the day. The symptoms are aggravated by position and sitting. Stiffness is present at night. Associated symptoms include leg pain, numbness (and tingling right anterior thigh), paresthesias and tingling. Pertinent negatives include no abdominal pain, bladder incontinence, bowel incontinence, chest pain, dysuria, fever, headaches, paresis, pelvic pain, perianal numbness, weakness or weight loss. Risk factors include menopause.        The patient is a 73-year-old right-handed woman who appears quite a bit younger than her stated age who presents today with now about 9 months of low back discomfort associated with a sensation of abnormal perception of touch in the right anterior thigh.  She does note that she is able to feel touch and even light touch but it feels different than the other side.  The discomfort in her low back is worse with being in active and she particularly notes this and days where she will not do very much like a Sunday where she sits in Christianity for a long period of time.  Normally throughout the week she is fairly active walking 2 or 3 miles a day and her back she is aware of it but it doesn't hurt very much.  She is not at all aware of the right thigh sensation at all throughout a normal day until the end of the day when she becomes less active and gets ready to go to bed and gets into bed and then her back starts to hurt more  and then after wiggling around a little bit she is able to get to sleep.     The patient notes that being seated in her car or in specific chairs will aggravate her low back pain.  She finds that a straight back chair gives her the most relief. At the worst she rates the pain as 5 out of 10 on a scale of 1-10 but most the time is less than that.     She does note occasional and that's not daily but occasional shooting discomfort into the right anterior thigh.  The pain and abnormal sensation in the right thigh does not go below the knee.  It is limited to the anterior portion of the thigh.    She's been treated with oral steroid medication, formal physical therapy, and chiropractic treatment.  She is no longer seeing a chiropractor but she continues to do home physical exercises.  She is quite active.  She notes that over the last few months she's noted an increase in her low back pain.  The right thigh sensory changes are unchanged.    She was evaluated by Coral Douglas and a new MRI of the lumbar spine was ordered and she reports to the office for consultation today.    The following portions of the patient's history were reviewed and updated as appropriate: allergies, current medications, past family history, past medical history, past social history, past surgical history and problem list.    Review of Systems   Constitutional: Negative for fever and weight loss.   Cardiovascular: Negative for chest pain.   Gastrointestinal: Negative for abdominal pain and bowel incontinence.   Genitourinary: Negative for bladder incontinence, dysuria and pelvic pain.   Musculoskeletal: Positive for back pain. Negative for gait problem.   Neurological: Positive for tingling, numbness (and tingling right anterior thigh) and paresthesias. Negative for weakness and headaches.       Objective   Physical Exam   Musculoskeletal:        Back:    Positive Austin's finger, negative Lino, negative Gaenslen, negative compression, negative  thigh thrust, negative distraction.   Neurological: She has a normal Finger-Nose-Finger Test, a normal Heel to Shin Test, a normal Romberg Test and a normal Tandem Gait Test. Gait normal.     Neurologic Exam     Sensory Exam   Right arm light touch: normal  Left arm light touch: normal  Right arm vibration: normal  Left arm vibration: normal  Right arm pinprick: normal  Left arm pinprick: normal  Right leg pinprick: normal  Left leg pinprick: normal    Gait, Coordination, and Reflexes     Gait  Gait: normal    Coordination   Romberg: negative  Finger to nose coordination: normal  Heel to shin coordination: normal  Tandem walking coordination: normal    Reflexes   Reflexes 2+ except as noted.       Assessment/Plan   Independent Review of Radiographic Studies:    I personally reviewed the MRI of the lumbar spine from January and the MRI done in July at the end of the month with contrast.  There are no specific changes between the 2 images.  There is mild degenerative arthritic change in the lumbar spine with a small disc protrusion to the right at L2-3 that only causes some mild neural foraminal narrowing and a small disc protrusion at L3 4 on the right also causing only some mild neural foraminal narrowing.  Medical Decision Making:      The patient presents with the above-noted history and physical findings.  There are certainly no red flags.    Initially I had concerns that we could be dealing with sacroiliitis but her exam is really negative for sacroiliitis.    There are certainly no surgical indication.    I suspect that her back discomfort as well as the subjective sensation of sensory change in the anterior thigh are manifestations of an arthritic change that is occurring in her lumbar spine.  Overall her spine looks very good.  I do not see severe degeneration neither do I see nerve root compression.    Treatment options I believe are limited to medical management.  I suspect that a consideration of a more  "rigorous exercise regime with regard to the low back may be appropriate.  I'm going to go ahead and recommend that she purchase of both that has a fairly simple home exercise program that actually she probably will follow and it may help.    Additionally I would recommend that she discuss with her primary care physician the use of nonsteroidal anti-inflammatory medication.  I am not going to prescribe that because he can has some systematic effects on the body and I would prefer that she consider the use of this medication judiciously along with her primary care physician.    And finally I recommended a book called \"Eight Steps to a Pain Free Back\" which is written by Alix Bello and available on [employer] for less than the price of a single physical therapy co-pay. This book outlines this injury that posture plays a very specific role in the development of the western civilizations epidemic of spine disease. It compares third world economy's and our first world relatively physically less challenging lifestyle. It points out that the postural habits of people in the third world are ingrained and that they suffer from minimal complaints with regard to back discomfort or neck discomfort despite the substantial increase in physical labor that they perform on a daily basis. Most importantly this book outlines an exercise program to strengthen core musculature and other muscles of posture both from the spine in the lumbar region and the neck. It has worked for me and may other pateints who have tried the exercise program.    If her numbness persists or particularly worsens then consideration of referral to neurology would be appropriate.  Certainly at this time I do not see a specific spinal compressive lesion that would be amenable to surgical management.    Denisse was seen today for back pain.    Diagnoses and all orders for this visit:    Chronic right-sided low back pain with right-sided sciatica    DDD " (degenerative disc disease), lumbar      Return if symptoms worsen or fail to improve.

## 2018-09-18 ENCOUNTER — TELEPHONE (OUTPATIENT)
Dept: NEUROSURGERY | Facility: CLINIC | Age: 73
End: 2018-09-18

## 2018-09-18 DIAGNOSIS — R20.2 PARESTHESIA OF RIGHT LEG: Primary | ICD-10-CM

## 2018-09-18 NOTE — TELEPHONE ENCOUNTER
Pt was last seen by  on 9/11/18    Pt states that [facility] told her to see a neurologist for numbness in right thigh. Pt found a neurologist to see Dr. Pasha Parker  [phone] and said she would need the referral sent over.     There is no order for Neurologist in chart.   Please advise.    Best call back number is [phone]

## 2018-09-19 NOTE — TELEPHONE ENCOUNTER
Monica harris/ Neurology [phone]  Is wanting to know how soon pt needs to be seen. Dr Parker is booking in mid Nov. She said that she can get her in with Dr Veronica Delgado the first part of Oct.    Please advise

## 2018-10-04 ENCOUNTER — OFFICE VISIT (OUTPATIENT)
Dept: NEUROLOGY | Facility: CLINIC | Age: 73
End: 2018-10-04

## 2018-10-04 VITALS
BODY MASS INDEX: 21.49 KG/M2 | SYSTOLIC BLOOD PRESSURE: 121 MMHG | DIASTOLIC BLOOD PRESSURE: 80 MMHG | HEART RATE: 74 BPM | WEIGHT: 129 LBS | OXYGEN SATURATION: 94 % | HEIGHT: 65 IN

## 2018-10-04 DIAGNOSIS — M79.2 NEUROPATHIC PAIN: Primary | ICD-10-CM

## 2018-10-04 DIAGNOSIS — R20.0 NUMBNESS: ICD-10-CM

## 2018-10-04 PROCEDURE — 99204 OFFICE O/P NEW MOD 45 MIN: CPT | Performed by: PSYCHIATRY & NEUROLOGY

## 2018-10-04 NOTE — PROGRESS NOTES
Denisse Marino is a 73 y.o. female presents for   Chief Complaint   Patient presents with   • Numbness             CHIEF COMPLAINT:  Neurological Consultation from Robert Gary MD for pain and numbness in the right thigh    History of Present Illness  Pleasant 73-year-old right-handed referred from Dr. Robert Gary in neurosurgery with a chief complaint of pain and numbness in the right anterior groin and thigh.  History was obtained from the patient, i.e. review of the medical history and Epic special attention to the note from neurosurgery, looked up her MRI reports of the stool of them as well as a review by neurosurgery, and review of the history form as the patient filled out.  For no clear reason work whatsoever and starting in January of this year patient starts having pain and numbness in the right anterior medial thigh.  The pain starts in the groin and then stress over the anterior medial thigh stops as a level of the knee.  With the pain she also has had some numbness in the exact same distribution.  The pain is described as shooting it always comes and goes since the time of its onset, the more active Z better she feels sitting down seems to aggravate the pain or at least makes it more noticeable, she has also has some associated right paralumbar pain without radicular symptoms.  Particularly she has no difficulty going upstairs or downstairs.  She has not experienced any changes in bowel or bladder or sexual function.  She has no abnormal sensation and deep in the pelvic region.  She has not noted any swelling in her right groin.  She has had no swelling in the right lower extremity either.  Even though started back in January overall this has not been progressive namely it has not spread to any new areas nor has it receded from original area of onset . By Way of evaluation patient has had 2 MRI scans of the lumbar region  Has been seen in neurosurgery but has not had any lab tests no nerve  conduction studies.  Comprehensive neurological review is negative    Pertinent history is negative for diabetes, negative for any weight loss, negative for bowel bladder or sexual dysfunction, negative for an any unsolicited differential diagnosis of mononeuritis multiplex.  Review of system for possible pelvic pathology is negative.          I have independently complete his review of systems, past history, social history, family history and medications review The following portions of the patient's history were reviewed and updated as appropriate: allergies, current medications, past family history, past medical history, past social history, past surgical history and problem list.        Review of Systems   Constitutional: Negative.    HENT: Positive for hearing loss. Negative for congestion, dental problem, drooling, ear discharge, ear pain, facial swelling, mouth sores, nosebleeds, postnasal drip, rhinorrhea, sinus pain, sinus pressure, sneezing, sore throat, tinnitus, trouble swallowing and voice change.    Eyes: Negative.    Respiratory: Negative.    Cardiovascular: Negative.    Gastrointestinal: Negative.    Endocrine: Negative.    Genitourinary: Negative.    Musculoskeletal: Positive for back pain. Negative for arthralgias, gait problem, joint swelling, myalgias, neck pain and neck stiffness.   Skin: Negative.    Allergic/Immunologic: Negative.    Neurological: Positive for numbness. Negative for dizziness, tremors, seizures, syncope, facial asymmetry, speech difficulty, weakness, light-headedness and headaches.   Hematological: Negative.    Psychiatric/Behavioral: Negative.          Past Medical History:   Diagnosis Date   • Hepatitis, viral     11 years old, did not know what kind         Social History     Social History   • Marital status:      Spouse name: N/A   • Number of children: N/A   • Years of education: N/A     Occupational History   • retired, [employer] with [employer]      retired     Social  History Main Topics   • Smoking status: Never Smoker   • Smokeless tobacco: Never Used   • Alcohol use 0.6 oz/week     1 Glasses of wine per week      Comment: several times per week   • Drug use: No   • Sexual activity: Defer     Other Topics Concern   • Not on file     Social History Narrative   • No narrative on file          Family History   Problem Relation Age of Onset   • Cancer Mother 82        cervical sarcoma   • Diabetes Mother    • Hypertension Mother    • Heart disease Father    • Heart attack Father    • No Known Problems Sister    • Cancer Brother         prostate   • Diabetes Brother    • No Known Problems Brother    • Heart disease Maternal Grandfather    • Dementia Maternal Grandmother    • Colon cancer Neg Hx            Current Outpatient Prescriptions:   •  B Complex Vitamins (VITAMIN-B COMPLEX PO), Take  by mouth Daily., Disp: , Rfl:   •  Cholecalciferol (VITAMIN D3) 5000 units capsule capsule, Take 5,000 Units by mouth Daily., Disp: , Rfl:   •  estradiol-norethindrone (ACTIVELLA) 1-0.5 MG per tablet, Take 1 tablet by mouth. Uses sporadically, Disp: , Rfl:   •  Multiple Vitamins-Minerals (MULTIVITAMIN ADULT PO), Take  by mouth., Disp: , Rfl:       Vitals:    10/04/18 1431   BP: 121/80   Pulse: 74   SpO2: 94%         Physical Exam   Constitutional: She is oriented to person, place, and time. She appears well-developed and well-nourished. No distress.   HENT:   Head: Normocephalic and atraumatic.   Mouth/Throat: Oropharynx is clear and moist. No oropharyngeal exudate.   Eyes: Pupils are equal, round, and reactive to light. Conjunctivae and EOM are normal. Right eye exhibits no discharge. Left eye exhibits no discharge. No scleral icterus.   Neck: Normal range of motion.   Cardiovascular: Normal rate and regular rhythm.    Normal to palpation and auscultation.  Normal upstroke   Pulmonary/Chest: Effort normal and breath sounds normal. No respiratory distress.   Abdominal: Soft. Bowel sounds are  normal.   Neurological: She is oriented to person, place, and time. She has normal strength. She has a normal Finger-Nose-Finger Test, a normal Heel to Shin Test, a normal Romberg Test and a normal Tandem Gait Test. Gait normal.   Skin: She is not diaphoretic.   Psychiatric: She has a normal mood and affect. Her speech is normal and behavior is normal. Judgment and thought content normal.         Neurologic Exam     Mental Status   Oriented to person, place, and time.   Attention: normal. Concentration: normal.   Speech: speech is normal   Level of consciousness: alert  Knowledge: consistent with education.   Able to name object. Able to read. Able to repeat. Able to write. Normal comprehension.   Normal Mini-Mental status exam     Cranial Nerves     CN II   Visual fields full to confrontation.     CN III, IV, VI   Pupils are equal, round, and reactive to light.  Extraocular motions are normal.   Right pupil: Size: 4 mm. Shape: regular. Reactivity: brisk. Consensual response: intact. Accommodation: intact.   Left pupil: Size: 4 mm. Shape: regular. Reactivity: brisk. Consensual response: intact. Accommodation: intact.   CN III: no CN III palsy  CN VI: no CN VI palsy  Nystagmus: none   Diplopia: none  Ophthalmoparesis: none  Upgaze: normal  Downgaze: normal  Conjugate gaze: present    CN V   Facial sensation intact.     CN VII   Facial expression full, symmetric.     CN VIII   CN VIII normal.     CN IX, X   CN IX normal.     CN XI   CN XI normal.     CN XII   CN XII normal.     Motor Exam   Muscle bulk: normal  Overall muscle tone: normal  Right arm tone: normal    Strength   Strength 5/5 throughout. Detailed examination of the major muscles groups in the lower extremities is completely intact including hip flexion, hip abduction, hip abduction, hip extension, knee flexion extension, dorsiflexion plantarflexion inversion eversion at the ankle dose extension and flexion.  He is able to toe walk and heel walk without  difficulty.  Squatting is completely normal.  There is no muscle atrophy whatsoever.  I cannot precipitate his pain to palpation.  Evaluation of the groin reveals no masses.     Sensory Exam   Light touch normal.   Vibration normal.   Proprioception normal.   Pinprick normal.   Graphesthesia: normal  Stereognosis: normal  Detailed sensory examination is a distribution of the obturator nerve, femoral nerve and its branches s as well as the L4-L5 and the S1 distribution is completely normal.     Gait, Coordination, and Reflexes     Gait  Gait: normal    Coordination   Romberg: negative  Finger to nose coordination: normal  Heel to shin coordination: normal  Tandem walking coordination: normal    Tremor   Resting tremor: absent  Intention tremor: absent  Action tremor: absent    Reflexes   Reflexes 2+ except as noted.   Right Arauz: absent  Left Arauz: absent  Right ankle clonus: absent  Left ankle clonus: absent  Right pendular knee jerk: absent  Left pendular knee jerk: absent                   REVIEW OF STUDIES:  There has been no lap tests.  MRI of the lumbar spine ×2 shows some degenerative arthritic changes no etiology unclear account for her chief complaint    DIAGNOSIS, IMPRESSION AND PLAN:    Healthy 73-year-old with his nine-month history of right anterior medial thigh pain and numbness was essentially normal neurological exam and nonrevealing lumbar MRI ×2.  Sits always a most likely diagnosis he had of this could be mononeuritis multiplex although such a duration coming and going without progression is highly unlikely.  I do not feel any masses in his groin to suggest the possibility of entrapment obturator neuropathy but that could be better solicited by comprehensive EMG nerve conduction studies.  The femoral nerve and distribution normal to sensorimotor and reflexes.  This is a clearly not a diabetic amyotrophy as it has not been associated with his atrophy or weight loss.  It also has not really  involve the other side has been persistent and this unilateral presentation.  I am going to proceed with checking  ESR CBC differential, comprehensive chemistries, EMG nerve conduction studies.  I would have liked to check hemoglobin A1c but had having Medicare will not allow me to do so.

## 2018-10-15 ENCOUNTER — LAB (OUTPATIENT)
Dept: LAB | Facility: HOSPITAL | Age: 73
End: 2018-10-15

## 2018-10-15 DIAGNOSIS — M79.2 NEUROPATHIC PAIN: ICD-10-CM

## 2018-10-15 DIAGNOSIS — R20.0 NUMBNESS: ICD-10-CM

## 2018-10-15 LAB
ALBUMIN SERPL-MCNC: 4.4 G/DL (ref 3.5–5.2)
ALBUMIN/GLOB SERPL: 1.5 G/DL
ALP SERPL-CCNC: 52 U/L (ref 39–117)
ALT SERPL W P-5'-P-CCNC: 18 U/L (ref 1–33)
ANION GAP SERPL CALCULATED.3IONS-SCNC: 11.4 MMOL/L
AST SERPL-CCNC: 25 U/L (ref 1–32)
BASOPHILS # BLD AUTO: 0.02 10*3/MM3 (ref 0–0.2)
BASOPHILS NFR BLD AUTO: 0.4 % (ref 0–1.5)
BILIRUB SERPL-MCNC: 0.3 MG/DL (ref 0.1–1.2)
BUN BLD-MCNC: 12 MG/DL (ref 8–23)
BUN/CREAT SERPL: 14.1 (ref 7–25)
CALCIUM SPEC-SCNC: 9.5 MG/DL (ref 8.6–10.5)
CHLORIDE SERPL-SCNC: 105 MMOL/L (ref 98–107)
CO2 SERPL-SCNC: 26.6 MMOL/L (ref 22–29)
CREAT BLD-MCNC: 0.85 MG/DL (ref 0.57–1)
DEPRECATED RDW RBC AUTO: 45.5 FL (ref 37–54)
EOSINOPHIL # BLD AUTO: 0.04 10*3/MM3 (ref 0–0.7)
EOSINOPHIL NFR BLD AUTO: 0.7 % (ref 0.3–6.2)
ERYTHROCYTE [DISTWIDTH] IN BLOOD BY AUTOMATED COUNT: 13.4 % (ref 11.7–13)
ERYTHROCYTE [SEDIMENTATION RATE] IN BLOOD: 7 MM/HR (ref 0–30)
GFR SERPL CREATININE-BSD FRML MDRD: 66 ML/MIN/1.73
GLOBULIN UR ELPH-MCNC: 2.9 GM/DL
GLUCOSE BLD-MCNC: 112 MG/DL (ref 65–99)
HCT VFR BLD AUTO: 40.8 % (ref 35.6–45.5)
HGB BLD-MCNC: 13.5 G/DL (ref 11.9–15.5)
IMM GRANULOCYTES # BLD: 0 10*3/MM3 (ref 0–0.03)
IMM GRANULOCYTES NFR BLD: 0 % (ref 0–0.5)
LYMPHOCYTES # BLD AUTO: 1.46 10*3/MM3 (ref 0.9–4.8)
LYMPHOCYTES NFR BLD AUTO: 27.2 % (ref 19.6–45.3)
MCH RBC QN AUTO: 30.5 PG (ref 26.9–32)
MCHC RBC AUTO-ENTMCNC: 33.1 G/DL (ref 32.4–36.3)
MCV RBC AUTO: 92.3 FL (ref 80.5–98.2)
MONOCYTES # BLD AUTO: 0.2 10*3/MM3 (ref 0.2–1.2)
MONOCYTES NFR BLD AUTO: 3.7 % (ref 5–12)
NEUTROPHILS # BLD AUTO: 3.64 10*3/MM3 (ref 1.9–8.1)
NEUTROPHILS NFR BLD AUTO: 68 % (ref 42.7–76)
PLATELET # BLD AUTO: 219 10*3/MM3 (ref 140–500)
PMV BLD AUTO: 11.4 FL (ref 6–12)
POTASSIUM BLD-SCNC: 4.7 MMOL/L (ref 3.5–5.2)
PROT SERPL-MCNC: 7.3 G/DL (ref 6–8.5)
RBC # BLD AUTO: 4.42 10*6/MM3 (ref 3.9–5.2)
SODIUM BLD-SCNC: 143 MMOL/L (ref 136–145)
WBC NRBC COR # BLD: 5.36 10*3/MM3 (ref 4.5–10.7)

## 2018-10-15 PROCEDURE — 85025 COMPLETE CBC W/AUTO DIFF WBC: CPT | Performed by: PSYCHIATRY & NEUROLOGY

## 2018-10-15 PROCEDURE — 80053 COMPREHEN METABOLIC PANEL: CPT

## 2018-10-15 PROCEDURE — 85652 RBC SED RATE AUTOMATED: CPT

## 2018-10-15 PROCEDURE — 36415 COLL VENOUS BLD VENIPUNCTURE: CPT | Performed by: PSYCHIATRY & NEUROLOGY

## 2018-10-16 ENCOUNTER — PROCEDURE VISIT (OUTPATIENT)
Dept: NEUROLOGY | Facility: CLINIC | Age: 73
End: 2018-10-16

## 2018-10-16 DIAGNOSIS — M54.41 CHRONIC RIGHT-SIDED LOW BACK PAIN WITH RIGHT-SIDED SCIATICA: Primary | ICD-10-CM

## 2018-10-16 DIAGNOSIS — G89.29 CHRONIC RIGHT-SIDED LOW BACK PAIN WITH RIGHT-SIDED SCIATICA: Primary | ICD-10-CM

## 2018-10-16 PROCEDURE — 95909 NRV CNDJ TST 5-6 STUDIES: CPT | Performed by: PSYCHIATRY & NEUROLOGY

## 2018-10-16 PROCEDURE — 95886 MUSC TEST DONE W/N TEST COMP: CPT | Performed by: PSYCHIATRY & NEUROLOGY

## 2018-10-16 NOTE — PROGRESS NOTES
EMG and Nerve Conduction Studies    Please see data sheets for details on methods, temperatures and lab standards. EMG muscles tested for upper extremity studies include the deltoid, biceps, triceps, pronator teres, extensor digitorum communis, first dorsal interosseous and abductor pollicis brevis.  EMG muscles tested for lower extremity studies include the vastus lateralis, tibialis anterior, peroneus longus, medial gastrocnemius and extensor digitorum brevis.  Additional muscles tested as needed.  Paraspinal muscles tested as needed.  The complete report includes the data sheets.    Indication: Pain and numbness right anterior thigh  History: 73-year-old woman with onset of pain and numbness in January 2018 without any provocation.  It has been persistent but intermittent.  Sitting seems to provoke it but standing or walking does not.  She is very specific about it not being in the lateral thigh.       Ht: 165.1 cm  Wt: 58.5 kg; BMI 21.5  HbA1C: No results found for: HGBA1C  TSH: No results found for: TSH    Technical summary:  Nerve conduction studies were obtained in the right leg.  The foot was very cold at onset and so it was warmed.  For the sensory studies the temperature was at least 32°C in the field of study.  For the motors the temperature dropped but the latencies were normal and so temperature correction was not needed.  Needle examination was obtained on selected muscles in the right leg including further proximal 5 muscles and paraspinals    Results:  1.  Normal right sural sensory study.  2.  Normal right superficial peroneal sensory study.  3.  Normal right saphenous sensory study.  4.  Normal right peroneal motor study with exception of amplitude being slightly low at 1.8 mV from ankle stimulation  5.  Normal right tibial motor study.  6.  Normal needle examination of selected muscles of the right leg including high and low lumbar paraspinals    Impression:  Essentially normal study.  No evidence  of a right femoral neuropathy, peripheral polyneuropathy or a right lumbosacral radiculopathy by this study.  Although the right peroneal motor amplitude was mildly low there were no needle exam changes in the peroneal distribution and this is felt to be a normal variant.  Study results were discussed with the patient.    Pasha Parker M.D.              Dictated utilizing Dragon dictation.

## 2018-10-18 ENCOUNTER — APPOINTMENT (OUTPATIENT)
Dept: INFUSION THERAPY | Facility: HOSPITAL | Age: 73
End: 2018-10-18
Attending: PSYCHIATRY & NEUROLOGY

## 2018-11-01 ENCOUNTER — OFFICE VISIT (OUTPATIENT)
Dept: NEUROLOGY | Facility: CLINIC | Age: 73
End: 2018-11-01

## 2018-11-01 VITALS
HEIGHT: 65 IN | DIASTOLIC BLOOD PRESSURE: 60 MMHG | WEIGHT: 128 LBS | BODY MASS INDEX: 21.33 KG/M2 | OXYGEN SATURATION: 98 % | HEART RATE: 81 BPM | SYSTOLIC BLOOD PRESSURE: 130 MMHG

## 2018-11-01 DIAGNOSIS — G57.21 ANTERIOR FEMORAL CUTANEOUS NEUROPATHY OF RIGHT LOWER EXTREMITY: Primary | ICD-10-CM

## 2018-11-01 PROCEDURE — 99213 OFFICE O/P EST LOW 20 MIN: CPT | Performed by: PSYCHIATRY & NEUROLOGY

## 2018-11-01 NOTE — PROGRESS NOTES
Denisse is here for follow up  She has continued to have the burning the top of her right thigh: last time there was a concern if the issue was mostly with the Obturator nerve  Today as she draws it it is clearly in the distribution the right intermed and medial cutaneous branch of the femoral nerve  This is definitely burring sesntion that has been going on or 10 months without change    History remains otherwise has remained unchanged    On examination the sensory exam is normal  All muscle groups hip and knee are normal inclusive of femoral nerve  DTR's are also normal      She had EMG/NCV with Dr Pasha Parker. I reviewed the official report and also discussed with Dr Parker today: all was normal    IMPRESSION:  Right anterior cutaneous branch Femoral nerve burning sensation for 10 months with normal exam and normal EMG/NCV    I reviewed with her the meaning of a normal EMG of the muscles of the thigh and the paraspinals as well as normal NCV  I was hoping for an EMG-identified target to do an MRI scan. Not having such a target I feel it is futile attempt  doing an MRI looking the needle in the haystack.  Denisse is comfortable after the workup to let things go for now and return PRN when things change.

## 2018-12-27 ENCOUNTER — OFFICE VISIT (OUTPATIENT)
Dept: NEUROLOGY | Facility: CLINIC | Age: 73
End: 2018-12-27

## 2018-12-27 VITALS
BODY MASS INDEX: 21.33 KG/M2 | HEIGHT: 65 IN | WEIGHT: 128 LBS | DIASTOLIC BLOOD PRESSURE: 72 MMHG | HEART RATE: 80 BPM | SYSTOLIC BLOOD PRESSURE: 128 MMHG

## 2018-12-27 DIAGNOSIS — G57.21 ANTERIOR FEMORAL CUTANEOUS NEUROPATHY OF RIGHT LOWER EXTREMITY: Primary | ICD-10-CM

## 2018-12-27 PROCEDURE — 99214 OFFICE O/P EST MOD 30 MIN: CPT | Performed by: PSYCHIATRY & NEUROLOGY

## 2018-12-27 NOTE — PROGRESS NOTES
Follow Up Visit:  Denisse called earlier today and requested to be seen  Since I last saw her she has continued to complain of right anterior medial discomfort  The story today is a little different: now she no longer has burning sensation she describes tightness in the anterior and medial right thigh that is present only when she sitting up. When she walks it goes away. When she lies down it goes away  Nothing else brings it on  She has no weakness and no numbness   Detailed review else no contributory systemic factors    Review: Problem List and Followup    Review of Systems   Constitutional: Negative for chills, fatigue and fever.   HENT: Negative for hearing loss, tinnitus and trouble swallowing.    Eyes: Negative for photophobia, pain and redness.   Respiratory: Negative for cough, shortness of breath and wheezing.    Cardiovascular: Negative for chest pain, palpitations and leg swelling.   Gastrointestinal: Negative for diarrhea, nausea and vomiting.   Endocrine: Negative for cold intolerance, heat intolerance and polydipsia.   Genitourinary: Negative for decreased urine volume, difficulty urinating, frequency and urgency.   Musculoskeletal: Positive for back pain (hip ). Negative for gait problem, neck pain and neck stiffness.   Skin: Negative for color change, rash and wound.   Allergic/Immunologic: Negative for environmental allergies, food allergies and immunocompromised state.   Neurological: Positive for numbness (tight feeling right leg). Negative for dizziness, tremors, seizures, syncope, facial asymmetry, speech difficulty, weakness, light-headedness and headaches.   Hematological: Negative for adenopathy. Does not bruise/bleed easily.   Psychiatric/Behavioral: Negative for agitation, behavioral problems, confusion, decreased concentration, dysphoric mood, hallucinations, self-injury, sleep disturbance and suicidal ideas. The patient is not nervous/anxious and is not hyperactive.        ROS  "Update  As above    Vitals:    12/27/18 1325   Weight: 58.1 kg (128 lb)   Height: 165.1 cm (65\")     I asked her to put on a gown  I see no differences in the size or color of the right thigh versus the elft  No muscle atrophy  All muscle groups in the thigh region are normal  Iliopsoas is normal  Knee flexors and extensors as well as all hip movement muscle groups are normal  Deep palpation does not solicit hwer pain or chief complaint  Nothing I do elicits her discomfort  Sensory is normal  Palpation of the right groin and the pelvis region are normal  Straight leg raising as well as extreme hip flexion do not elicit her discomfort            Review Results-Workup/Diagnoses/Plan  Discomfort in right branch femoral nerve of unknown etiology    PL:AN: MRI of pelvis and see me afterwards  "

## 2019-01-09 ENCOUNTER — HOSPITAL ENCOUNTER (OUTPATIENT)
Dept: MRI IMAGING | Facility: HOSPITAL | Age: 74
Discharge: HOME | End: 2019-01-09
Attending: PSYCHIATRY & NEUROLOGY | Admitting: PSYCHIATRY & NEUROLOGY

## 2019-01-09 DIAGNOSIS — G57.21 ANTERIOR FEMORAL CUTANEOUS NEUROPATHY OF RIGHT LOWER EXTREMITY: ICD-10-CM

## 2019-01-09 PROCEDURE — 0 GADOBENATE DIMEGLUMINE 529 MG/ML SOLUTION: Performed by: PSYCHIATRY & NEUROLOGY

## 2019-01-09 PROCEDURE — 72197 MRI PELVIS W/O & W/DYE: CPT

## 2019-01-09 PROCEDURE — 82565 ASSAY OF CREATININE: CPT

## 2019-01-09 PROCEDURE — A9577 INJ MULTIHANCE: HCPCS | Performed by: PSYCHIATRY & NEUROLOGY

## 2019-01-09 RX ADMIN — GADOBENATE DIMEGLUMINE 12 ML: 529 INJECTION, SOLUTION INTRAVENOUS at 07:37

## 2019-01-10 LAB — CREAT BLDA-MCNC: 0.8 MG/DL (ref 0.6–1.3)

## 2019-01-11 ENCOUNTER — OFFICE VISIT (OUTPATIENT)
Dept: NEUROLOGY | Facility: CLINIC | Age: 74
End: 2019-01-11

## 2019-01-11 VITALS
BODY MASS INDEX: 21.33 KG/M2 | DIASTOLIC BLOOD PRESSURE: 58 MMHG | HEIGHT: 65 IN | SYSTOLIC BLOOD PRESSURE: 100 MMHG | WEIGHT: 128 LBS

## 2019-01-11 DIAGNOSIS — G57.21 ANTERIOR FEMORAL CUTANEOUS NEUROPATHY OF RIGHT LOWER EXTREMITY: Primary | ICD-10-CM

## 2019-01-11 PROCEDURE — 99213 OFFICE O/P EST LOW 20 MIN: CPT | Performed by: PSYCHIATRY & NEUROLOGY

## 2019-01-11 NOTE — PROGRESS NOTES
"Follow Up Visit:  Denisse is seen in follow up after having ahd there pelvis MRI  regarding the pressure she feels in her right thigh    Review: Problem List and Followup  Her sympotms have remained unchanged  No updates to report     Review of Systems   Gastrointestinal: Positive for constipation. Negative for diarrhea, nausea and vomiting.   Musculoskeletal: Positive for back pain. Negative for gait problem, neck pain and neck stiffness.   Skin: Negative for color change, rash and wound.   Allergic/Immunologic: Negative for environmental allergies, food allergies and immunocompromised state.   Neurological: Positive for numbness. Negative for dizziness, tremors, seizures, syncope, facial asymmetry, speech difficulty, weakness, light-headedness and headaches.   Hematological: Negative for adenopathy. Does not bruise/bleed easily.   Psychiatric/Behavioral: Negative for agitation, behavioral problems, confusion, decreased concentration, dysphoric mood, hallucinations, self-injury, sleep disturbance and suicidal ideas. The patient is not nervous/anxious and is not hyperactive.              Vitals:    01/11/19 1346   BP: 100/58   Weight: 58.1 kg (128 lb)   Height: 165.1 cm (65\")           Review Results-Workup/Diagnoses/Plan  I brought up the MRI and reviewed with her   The MRI is normal    I told ehr I have so far evaluated her with MRI spine, EMG/NCV and now MRI pelvis  There is no further indication at present for me to do any further testing  She will be seen PRN  "

## 2019-02-26 DIAGNOSIS — R20.0 NUMBNESS: ICD-10-CM

## 2019-02-26 DIAGNOSIS — M79.2 NEUROPATHIC PAIN: ICD-10-CM

## 2019-02-26 DIAGNOSIS — R20.0 NUMBNESS: Primary | ICD-10-CM

## 2019-02-27 DIAGNOSIS — Z86.19 HISTORY OF HEPATITIS: Primary | ICD-10-CM

## 2019-02-27 DIAGNOSIS — Z11.59 ENCOUNTER FOR SCREENING FOR OTHER VIRAL DISEASES: ICD-10-CM

## 2019-02-27 DIAGNOSIS — Z83.79 FAMILY HISTORY OF HEPATITIS: ICD-10-CM

## 2019-02-28 LAB
ALBUMIN SERPL-MCNC: 4.9 G/DL (ref 3.5–5.2)
ALBUMIN/GLOB SERPL: 2 G/DL
ALP SERPL-CCNC: 48 U/L (ref 39–117)
ALT SERPL-CCNC: 12 U/L (ref 1–33)
AST SERPL-CCNC: 23 U/L (ref 1–32)
BASOPHILS # BLD AUTO: 0.02 10*3/MM3 (ref 0–0.2)
BASOPHILS # BLD MANUAL: 0.11 10*3/MM3 (ref 0–0.2)
BASOPHILS NFR BLD AUTO: 0.4 % (ref 0–1.5)
BASOPHILS NFR BLD MANUAL: 2.2 % (ref 0–1.5)
BILIRUB SERPL-MCNC: 0.6 MG/DL (ref 0.1–1.2)
BUN SERPL-MCNC: 12 MG/DL (ref 8–23)
BUN/CREAT SERPL: 14.1 (ref 7–25)
CALCIUM SERPL-MCNC: 9.6 MG/DL (ref 8.6–10.5)
CHLORIDE SERPL-SCNC: 103 MMOL/L (ref 98–107)
CO2 SERPL-SCNC: 26.8 MMOL/L (ref 22–29)
CREAT SERPL-MCNC: 0.85 MG/DL (ref 0.57–1)
DIFFERENTIAL COMMENT: ABNORMAL
EOSINOPHIL # BLD AUTO: 0.06 10*3/MM3 (ref 0–0.4)
EOSINOPHIL NFR BLD AUTO: 1.2 % (ref 0.3–6.2)
ERYTHROCYTE [DISTWIDTH] IN BLOOD BY AUTOMATED COUNT: 13.2 % (ref 12.3–15.4)
GLOBULIN SER CALC-MCNC: 2.4 GM/DL
GLUCOSE SERPL-MCNC: 104 MG/DL (ref 65–99)
HBV SURFACE AB SER QL: NON REACTIVE
HBV SURFACE AG SERPL QL IA: NEGATIVE
HCT VFR BLD AUTO: 43.7 % (ref 34–46.6)
HCV AB S/CO SERPL IA: <0.1 S/CO RATIO (ref 0–0.9)
HGB BLD-MCNC: 13.9 G/DL (ref 12–15.9)
IMM GRANULOCYTES # BLD AUTO: 0.02 10*3/MM3 (ref 0–0.05)
IMM GRANULOCYTES NFR BLD AUTO: 0.4 % (ref 0–0.5)
LYMPHOCYTES # BLD AUTO: 1.64 10*3/MM3 (ref 0.7–3.1)
LYMPHOCYTES # BLD MANUAL: 0.83 10*3/MM3 (ref 0.7–3.1)
LYMPHOCYTES NFR BLD AUTO: 31.7 % (ref 19.6–45.3)
LYMPHOCYTES NFR BLD MANUAL: 16.1 % (ref 19.6–45.3)
MCH RBC QN AUTO: 30.4 PG (ref 26.6–33)
MCHC RBC AUTO-ENTMCNC: 31.8 G/DL (ref 31.5–35.7)
MCV RBC AUTO: 95.6 FL (ref 79–97)
MONOCYTES # BLD AUTO: 0.32 10*3/MM3 (ref 0.1–0.9)
MONOCYTES # BLD MANUAL: 0.39 10*3/MM3 (ref 0.1–0.9)
MONOCYTES NFR BLD AUTO: 6.2 % (ref 5–12)
MONOCYTES NFR BLD MANUAL: 7.5 % (ref 5–12)
NEUTROPHILS # BLD AUTO: 3.11 10*3/MM3 (ref 1.4–7)
NEUTROPHILS # BLD MANUAL: 3.84 10*3/MM3 (ref 1.4–7)
NEUTROPHILS NFR BLD AUTO: 60.1 % (ref 42.7–76)
NEUTROPHILS NFR BLD MANUAL: 74.2 % (ref 42.7–76)
PLATELET # BLD AUTO: 144 10*3/MM3 (ref 140–450)
PLATELET BLD QL SMEAR: ABNORMAL
POTASSIUM SERPL-SCNC: 4.1 MMOL/L (ref 3.5–5.2)
PROT SERPL-MCNC: 7.3 G/DL (ref 6–8.5)
RBC # BLD AUTO: 4.57 10*6/MM3 (ref 3.77–5.28)
RBC MORPH BLD: ABNORMAL
SODIUM SERPL-SCNC: 144 MMOL/L (ref 136–145)
WBC # BLD AUTO: 5.17 10*3/MM3 (ref 3.4–10.8)

## 2019-03-06 ENCOUNTER — OFFICE VISIT (OUTPATIENT)
Dept: FAMILY MEDICINE CLINIC | Facility: CLINIC | Age: 74
End: 2019-03-06

## 2019-03-06 VITALS
BODY MASS INDEX: 21.58 KG/M2 | RESPIRATION RATE: 19 BRPM | HEART RATE: 77 BPM | SYSTOLIC BLOOD PRESSURE: 130 MMHG | HEIGHT: 65 IN | OXYGEN SATURATION: 98 % | TEMPERATURE: 97.7 F | WEIGHT: 129.5 LBS | DIASTOLIC BLOOD PRESSURE: 80 MMHG

## 2019-03-06 DIAGNOSIS — Z23 NEED FOR VACCINATION: ICD-10-CM

## 2019-03-06 DIAGNOSIS — Z79.890 HORMONE REPLACEMENT THERAPY (HRT): ICD-10-CM

## 2019-03-06 DIAGNOSIS — Z00.00 MEDICARE ANNUAL WELLNESS VISIT, SUBSEQUENT: Primary | ICD-10-CM

## 2019-03-06 PROCEDURE — G0439 PPPS, SUBSEQ VISIT: HCPCS | Performed by: FAMILY MEDICINE

## 2019-03-06 NOTE — PROGRESS NOTES
QUICK REFERENCE INFORMATION:  The ABCs of the Annual Wellness Visit    Subsequent Medicare Wellness Visit    HEALTH RISK ASSESSMENT    1945    Recent Hospitalizations:  No hospitalization(s) within the last year..        Current Medical Providers:  Patient Care Team:  Stefany Bocanegra MD as PCP - General (Family Medicine)  Jonathan Stewart MD as PCP - Claims Attributed  Jonathan Stewart MD as Surgeon (Sports Medicine)        Smoking Status:  Social History     Tobacco Use   Smoking Status Never Smoker   Smokeless Tobacco Never Used       Alcohol Consumption:  Social History     Substance and Sexual Activity   Alcohol Use Yes   • Alcohol/week: 0.6 oz   • Types: 1 Glasses of wine per week    Comment: several times per week       Depression Screen:   PHQ-2/PHQ-9 Depression Screening 3/6/2019   Little interest or pleasure in doing things 0   Feeling down, depressed, or hopeless 0   Total Score 0       Health Habits and Functional and Cognitive Screening:  Functional & Cognitive Status 3/6/2019   Do you have difficulty preparing food and eating? No   Do you have difficulty bathing yourself, getting dressed or grooming yourself? No   Do you have difficulty using the toilet? No   Do you have difficulty moving around from place to place? No   Do you have trouble with steps or getting out of a bed or a chair? No   In the past year have you fallen or experienced a near fall? No   Current Diet Well Balanced Diet   Dental Exam Up to date   Eye Exam Up to date   Exercise (times per week) 3 times per week   Current Exercise Activities Include Walking   Do you need help using the phone?  No   Are you deaf or do you have serious difficulty hearing?  Yes   Do you need help with transportation? No   Do you need help shopping? No   Do you need help preparing meals?  No   Do you need help with housework?  No   Do you need help with laundry? No   Do you need help taking your medications? No   Do you need help managing money? No   Do  you ever drive or ride in a car without wearing a seat belt? No   Have you felt unusual stress, anger or loneliness in the last month? No   Who do you live with? Spouse   If you need help, do you have trouble finding someone available to you? No   Have you been bothered in the last four weeks by sexual problems? No   Do you have difficulty concentrating, remembering or making decisions? No           Does the patient have evidence of cognitive impairment? No    Aspirin use counseling: Does not need ASA (and currently is not on it)      Recent Lab Results:  CMP:  Lab Results   Component Value Date     (H) 02/27/2019    BUN 12 02/27/2019    CREATININE 0.85 02/27/2019    EGFRIFNONA 66 02/27/2019    EGFRIFAFRI 80 02/27/2019    BCR 14.1 02/27/2019     02/27/2019    K 4.1 02/27/2019    CO2 26.8 02/27/2019    CALCIUM 9.6 02/27/2019    PROTENTOTREF 7.3 02/27/2019    ALBUMIN 4.90 02/27/2019    LABGLOBREF 2.4 02/27/2019    LABIL2 2.0 02/27/2019    BILITOT 0.6 02/27/2019    ALKPHOS 48 02/27/2019    AST 23 02/27/2019    ALT 12 02/27/2019     Lipid Panel:     HbA1c:       Visual Acuity:  No exam data present    Age-appropriate Screening Schedule:  Refer to the list below for future screening recommendations based on patient's age, sex and/or medical conditions. Orders for these recommended tests are listed in the plan section. The patient has been provided with a written plan.    Health Maintenance   Topic Date Due   • TDAP/TD VACCINES (1 - Tdap) 06/14/1966   • ZOSTER VACCINE (1 of 2) 06/14/1997   • PNEUMOCOCCAL VACCINES (65+ LOW/MEDIUM RISK) (1 of 2 - PCV13) 06/14/2012   • MAMMOGRAM  09/08/2019   • COLONOSCOPY  11/14/2027   • INFLUENZA VACCINE  Completed        Subjective   History of Present Illness    Denisse Marino is a 73 y.o. female who presents for an Subsequent Wellness Visit.    The following portions of the patient's history were reviewed and updated as appropriate: allergies, current medications, past  family history, past medical history, past social history, past surgical history and problem list.    Outpatient Medications Prior to Visit   Medication Sig Dispense Refill   • B Complex Vitamins (VITAMIN-B COMPLEX PO) Take  by mouth Daily.     • Cholecalciferol (VITAMIN D3) 5000 units capsule capsule Take 5,000 Units by mouth Daily.     • estradiol-norethindrone (ACTIVELLA) 1-0.5 MG per tablet Take 1 tablet by mouth. Uses sporadically     • Multiple Vitamins-Minerals (MULTIVITAMIN ADULT PO) Take  by mouth.       No facility-administered medications prior to visit.        Patient Active Problem List   Diagnosis   • Constipation   • DDD (degenerative disc disease), lumbar   • Chronic low back pain   • Numbness   • Neuropathic pain       Advance Care Planning:  has NO advance directive - information provided to the patient today    Identification of Risk Factors:  Risk factors include: hearing limitations.    Review of Systems    Compared to one year ago, the patient feels her physical health is better.  Compared to one year ago, the patient feels her mental health is the same.    Objective     Physical Exam   Constitutional: She is oriented to person, place, and time. She appears well-nourished. No distress.   HENT:   Right Ear: External ear normal.   Left Ear: External ear normal.   Nose: Nose normal.   Mouth/Throat: Oropharynx is clear and moist.   TMs and canals are clear, normal.   Eyes: Conjunctivae are normal. Right eye exhibits no discharge. Left eye exhibits no discharge. No scleral icterus.   Neck: Neck supple. No thyromegaly present.   Cardiovascular: Normal rate, regular rhythm, normal heart sounds and intact distal pulses.   No murmur heard.  Pulmonary/Chest: Effort normal and breath sounds normal. No respiratory distress. She has no wheezes. Right breast exhibits no inverted nipple, no mass, no nipple discharge, no skin change and no tenderness. Left breast exhibits no inverted nipple, no mass, no nipple  "discharge, no skin change and no tenderness.   Genitourinary: Vagina normal. No vaginal discharge found.   Genitourinary Comments: There is small clear white vaginal discharge and at cervical os. Os with surrounding of mild erythema. Normal bimanual exam.   Musculoskeletal: She exhibits no edema.   Lymphadenopathy:     She has no cervical adenopathy.   Neurological: She is alert and oriented to person, place, and time. She exhibits normal muscle tone.   Psychiatric: She has a normal mood and affect. Her behavior is normal.   Vitals reviewed.      Vitals:    03/06/19 1456   BP: 130/80   BP Location: Right arm   Patient Position: Sitting   Cuff Size: Adult   Pulse: 77   Resp: 19   Temp: 97.7 °F (36.5 °C)   TempSrc: Oral   SpO2: 98%   Weight: 58.7 kg (129 lb 8 oz)   Height: 165.1 cm (65\")       Patient's Body mass index is 21.55 kg/m². BMI is within normal parameters. No follow-up required..      Assessment/Plan   Patient Self-Management and Personalized Health Advice  The patient has been provided with information about: exercise and designing advance directives and preventive services including:   · Advance directive, Exercise counseling provided, Fall Risk assessment done, Pneumococcal vaccine , shingrix.    Visit Diagnoses:  No diagnosis found.    No orders of the defined types were placed in this encounter.      Outpatient Encounter Medications as of 3/6/2019   Medication Sig Dispense Refill   • B Complex Vitamins (VITAMIN-B COMPLEX PO) Take  by mouth Daily.     • Cholecalciferol (VITAMIN D3) 5000 units capsule capsule Take 5,000 Units by mouth Daily.     • estradiol-norethindrone (ACTIVELLA) 1-0.5 MG per tablet Take 1 tablet by mouth. Uses sporadically     • Multiple Vitamins-Minerals (MULTIVITAMIN ADULT PO) Take  by mouth.       No facility-administered encounter medications on file as of 3/6/2019.        Reviewed use of high risk medication in the elderly: yes  Reviewed for potential of harmful drug " interactions in the elderly: yes    Follow Up:  No Follow-up on file.     An After Visit Summary and PPPS with all of these plans were given to the patient.          TDaP in 8/27/2013  She is not immune to Hep B based on her recent lab results, recommended vaccine but pt declined.     Discussed all lab results in office.     HRT  Pt has been taking for years. She says supply lasts her 6-7 months. She takes about twice weekly. She has hot flashes that are unbearable and vaginal dryness. She is aware of blood clots and CV risk potential with hormone therapy and she would like to continue for quality of life. I will refill today.

## 2019-05-08 DIAGNOSIS — Z12.39 SCREENING FOR BREAST CANCER: Primary | ICD-10-CM

## 2019-09-03 RX ORDER — ESTROGEN,CON/M-PROGEST ACET 0.3-1.5MG
TABLET ORAL
Qty: 84 TABLET | Refills: 1 | Status: SHIPPED | OUTPATIENT
Start: 2019-09-03 | End: 2022-04-11

## 2021-03-09 DIAGNOSIS — Z23 IMMUNIZATION DUE: ICD-10-CM

## 2021-09-09 ENCOUNTER — OFFICE VISIT (OUTPATIENT)
Dept: SPORTS MEDICINE | Facility: CLINIC | Age: 76
End: 2021-09-09

## 2021-09-09 VITALS
BODY MASS INDEX: 21.49 KG/M2 | SYSTOLIC BLOOD PRESSURE: 118 MMHG | HEART RATE: 81 BPM | RESPIRATION RATE: 16 BRPM | HEIGHT: 65 IN | DIASTOLIC BLOOD PRESSURE: 76 MMHG | OXYGEN SATURATION: 97 % | WEIGHT: 129 LBS | TEMPERATURE: 98.3 F

## 2021-09-09 DIAGNOSIS — M17.11 PRIMARY OSTEOARTHRITIS OF RIGHT KNEE: ICD-10-CM

## 2021-09-09 DIAGNOSIS — M25.561 RIGHT KNEE PAIN, UNSPECIFIED CHRONICITY: Primary | ICD-10-CM

## 2021-09-09 PROCEDURE — 99214 OFFICE O/P EST MOD 30 MIN: CPT | Performed by: FAMILY MEDICINE

## 2021-09-09 PROCEDURE — 73562 X-RAY EXAM OF KNEE 3: CPT | Performed by: FAMILY MEDICINE

## 2021-09-09 NOTE — PROGRESS NOTES
"Denisse is a 76 y.o. year old female     Chief Complaint   Patient presents with   • Knee Pain     Right knee pain after falling and twisting her knee on Friday.        History of Present Illness  HPI   Here today for evaluation of injury to the right knee that occurred on Monday while she was hiking.  She slipped and twisted the knee, had immediate pain.  This has been improving gradually since then.    Review of Systems    /76 (BP Location: Left arm, Patient Position: Sitting, Cuff Size: Adult)   Pulse 81   Temp 98.3 °F (36.8 °C)   Resp 16   Ht 165.1 cm (65\")   Wt 58.5 kg (129 lb)   LMP  (LMP Unknown)   SpO2 97%   BMI 21.47 kg/m²      Physical Exam    Vital signs reviewed.   General: No acute distress.      MSK Exam:  Ortho Exam  Right knee: Normal appearance, no effusion.  There is mild medial joint tenderness to palpation.  Normal range of motion.  No ligamentous laxity.  Negative meniscal testing.    Right Knee X-Ray  Indication: Pain    Views: AP, Lateral, and [place]    Findings:  No fracture  No bony lesion  Normal soft tissues  Very mild degenerative changes with medial compartment joint space narrowing and patellofemoral narrowing as well    No prior studies were available for comparison.      Diagnoses and all orders for this visit:    Right knee pain, unspecified chronicity  -     XR Knee 3+ View With [place] Right  -     Diclofenac Sodium (VOLTAREN) 1 % gel gel; Apply 4 g topically to the appropriate area as directed 4 (Four) Times a Day As Needed (joint pain).    Primary osteoarthritis of right knee  -     Diclofenac Sodium (VOLTAREN) 1 % gel gel; Apply 4 g topically to the appropriate area as directed 4 (Four) Times a Day As Needed (joint pain).    Overall this appears benign, seems most consistent with mild flare of underlying arthritis, structurally seems stable.  Will use some topical diclofenac and expect this to resolve with minimal difficulty.      EMR Dragon/Transcription " disclaimer:    Much of this encounter note is an electronic transcription/translation of spoken language to printed text.  The electronic translation of spoken language may permit erroneous, or at times, nonsensical words or phrases to be inadvertently transcribed.  Although I have reviewed the note for such errors some may still exist.

## 2021-10-26 ENCOUNTER — HOSPITAL ENCOUNTER (EMERGENCY)
Facility: HOSPITAL | Age: 76
Discharge: HOME | End: 2021-10-26
Attending: EMERGENCY MEDICINE | Admitting: EMERGENCY MEDICINE

## 2021-10-26 ENCOUNTER — APPOINTMENT (OUTPATIENT)
Dept: GENERAL RADIOLOGY | Facility: HOSPITAL | Age: 76
End: 2021-10-26

## 2021-10-26 VITALS
RESPIRATION RATE: 16 BRPM | SYSTOLIC BLOOD PRESSURE: 196 MMHG | HEIGHT: 65 IN | OXYGEN SATURATION: 98 % | HEART RATE: 94 BPM | BODY MASS INDEX: 21.66 KG/M2 | WEIGHT: 130 LBS | TEMPERATURE: 97.3 F | DIASTOLIC BLOOD PRESSURE: 94 MMHG

## 2021-10-26 DIAGNOSIS — S80.12XA CONTUSION OF LEFT LOWER EXTREMITY, INITIAL ENCOUNTER: Primary | ICD-10-CM
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
## 2021-10-26 DIAGNOSIS — S39.012A STRAIN OF LUMBAR REGION, INITIAL ENCOUNTER: ICD-10-CM

## 2021-10-26 DIAGNOSIS — S80.812A ABRASION OF LEFT LOWER EXTREMITY, INITIAL ENCOUNTER: ICD-10-CM

## 2021-10-26 LAB — QT INTERVAL: 366 MS

## 2021-10-26 PROCEDURE — 93005 ELECTROCARDIOGRAM TRACING: CPT

## 2021-10-26 PROCEDURE — 90715 TDAP VACCINE 7 YRS/> IM: CPT | Performed by: EMERGENCY MEDICINE

## 2021-10-26 PROCEDURE — 25010000002 TETANUS-DIPHTH-ACELL PERTUSSIS 5-2.5-18.5 LF-MCG/0.5 SUSPENSION PREFILLED SYRINGE: Performed by: EMERGENCY MEDICINE

## 2021-10-26 PROCEDURE — 90471 IMMUNIZATION ADMIN: CPT | Performed by: EMERGENCY MEDICINE

## 2021-10-26 PROCEDURE — 93005 ELECTROCARDIOGRAM TRACING: CPT | Performed by: EMERGENCY MEDICINE

## 2021-10-26 PROCEDURE — 99284 EMERGENCY DEPT VISIT MOD MDM: CPT

## 2021-10-26 PROCEDURE — 93010 ELECTROCARDIOGRAM REPORT: CPT | Performed by: INTERNAL MEDICINE

## 2021-10-26 PROCEDURE — 73590 X-RAY EXAM OF LOWER LEG: CPT

## 2021-10-26 RX ORDER — ACETAMINOPHEN 500 MG
1000 TABLET ORAL ONCE
Status: COMPLETED | OUTPATIENT
Start: 2021-10-26 | End: 2021-10-26

## 2021-10-26 RX ORDER — CYCLOBENZAPRINE HCL 10 MG
10 TABLET ORAL 3 TIMES DAILY PRN
Qty: 12 TABLET | Refills: 0 | Status: SHIPPED | OUTPATIENT
Start: 2021-10-26 | End: 2022-04-11

## 2021-10-26 RX ADMIN — TETANUS TOXOID, REDUCED DIPHTHERIA TOXOID AND ACELLULAR PERTUSSIS VACCINE, ADSORBED 0.5 ML: 5; 2.5; 8; 8; 2.5 SUSPENSION INTRAMUSCULAR at 12:02

## 2021-10-26 RX ADMIN — ACETAMINOPHEN 1000 MG: 500 TABLET, FILM COATED ORAL at 11:25

## 2021-11-15 ENCOUNTER — TELEPHONE (OUTPATIENT)
Dept: FAMILY MEDICINE CLINIC | Facility: CLINIC | Age: 76
End: 2021-11-15

## 2021-11-15 NOTE — TELEPHONE ENCOUNTER
Patient called requesting a ref for a hearing test. She thinks she needs hearing aids, she can not hear much of anything.

## 2021-11-16 NOTE — TELEPHONE ENCOUNTER
Pt had called [facility] and scheduled appt for today. They called the office asking for a referral. It was denied due to Daljit's message as patient needs to be seen prior to a referral being place.

## 2021-11-16 NOTE — TELEPHONE ENCOUNTER
I have not seen her in over 2.5 years. Would be best to have her in for an exam and see how she is doing so we are sure to manage her hearing loss in the best way.

## 2022-04-11 ENCOUNTER — OFFICE VISIT (OUTPATIENT)
Dept: FAMILY MEDICINE CLINIC | Facility: CLINIC | Age: 77
End: 2022-04-11

## 2022-04-11 VITALS
HEART RATE: 81 BPM | SYSTOLIC BLOOD PRESSURE: 164 MMHG | OXYGEN SATURATION: 100 % | BODY MASS INDEX: 22.59 KG/M2 | TEMPERATURE: 97.8 F | WEIGHT: 135.6 LBS | DIASTOLIC BLOOD PRESSURE: 72 MMHG | HEIGHT: 65 IN | RESPIRATION RATE: 17 BRPM

## 2022-04-11 DIAGNOSIS — H91.93 BILATERAL HEARING LOSS, UNSPECIFIED HEARING LOSS TYPE: Primary | ICD-10-CM

## 2022-04-11 PROCEDURE — 99213 OFFICE O/P EST LOW 20 MIN: CPT | Performed by: FAMILY MEDICINE

## 2022-04-11 NOTE — PATIENT INSTRUCTIONS
Referral to [facility] ENT and Dr. Sherly Leary with Audiology in their office.

## 2022-04-11 NOTE — PROGRESS NOTES
"Chief Complaint  Hearing Problem (States loss in both ears)    Subjective          Denisse Marino presents to [facility]  History of Present Illness  Says thinks from aging. She says it is at the point where she needs to do something about it.   No ringing in her ears.   No ear pain.     Objective   Vital Signs:   /72 (BP Location: Right arm, Patient Position: Sitting, Cuff Size: Small Adult)   Pulse 81   Temp 97.8 °F (36.6 °C) (Temporal)   Resp 17   Ht 165.1 cm (65\")   Wt 61.5 kg (135 lb 9.6 oz)   SpO2 100%   BMI 22.57 kg/m²     BMI is within normal parameters. No follow-up required.      Physical Exam  Vitals reviewed.   Constitutional:       General: She is not in acute distress.  HENT:      Right Ear: Tympanic membrane, ear canal and external ear normal. There is no impacted cerumen.      Left Ear: Tympanic membrane, ear canal and external ear normal. There is no impacted cerumen.      Ears:      Comments: Tiny effusion bilaterally, membranes clear. No cerumen.  Eyes:      General: No scleral icterus.     Conjunctiva/sclera: Conjunctivae normal.   Pulmonary:      Effort: Pulmonary effort is normal. No respiratory distress.   Neurological:      Mental Status: She is alert and oriented to person, place, and time.   Psychiatric:         Behavior: Behavior normal.        Result Review :                 Assessment and Plan    Diagnoses and all orders for this visit:    1. Bilateral hearing loss, unspecified hearing loss type (Primary)  -     Ambulatory Referral to ENT (Otolaryngology)        Follow Up   No follow-ups on file.  Patient was given instructions and counseling regarding her condition or for health maintenance advice. Please see specific information pulled into the AVS if appropriate.     Discussed referral recommendation and process.   She would like testing and to proceed to help with her hearing loss management.   Recommended [facility] and to see " Dr. Sherly Leary. Pt is in agreement.

## 2022-09-13 ENCOUNTER — OFFICE VISIT (OUTPATIENT)
Dept: FAMILY MEDICINE CLINIC | Facility: CLINIC | Age: 77
End: 2022-09-13

## 2022-09-13 VITALS
OXYGEN SATURATION: 100 % | SYSTOLIC BLOOD PRESSURE: 175 MMHG | BODY MASS INDEX: 21.73 KG/M2 | TEMPERATURE: 96.9 F | WEIGHT: 130.4 LBS | HEIGHT: 65 IN | DIASTOLIC BLOOD PRESSURE: 66 MMHG | HEART RATE: 67 BPM

## 2022-09-13 DIAGNOSIS — Z78.0 POST-MENOPAUSAL: ICD-10-CM

## 2022-09-13 DIAGNOSIS — Z12.31 ENCOUNTER FOR SCREENING MAMMOGRAM FOR MALIGNANT NEOPLASM OF BREAST: ICD-10-CM

## 2022-09-13 DIAGNOSIS — Z13.220 SCREENING FOR LIPOID DISORDERS: ICD-10-CM

## 2022-09-13 DIAGNOSIS — R03.0 ELEVATED BLOOD PRESSURE READING IN OFFICE WITHOUT DIAGNOSIS OF HYPERTENSION: ICD-10-CM

## 2022-09-13 DIAGNOSIS — Z13.820 SCREENING FOR OSTEOPOROSIS: ICD-10-CM

## 2022-09-13 DIAGNOSIS — R73.9 HYPERGLYCEMIA: ICD-10-CM

## 2022-09-13 DIAGNOSIS — Z00.00 MEDICARE ANNUAL WELLNESS VISIT, SUBSEQUENT: Primary | ICD-10-CM

## 2022-09-13 PROCEDURE — 1160F RVW MEDS BY RX/DR IN RCRD: CPT | Performed by: NURSE PRACTITIONER

## 2022-09-13 PROCEDURE — 1170F FXNL STATUS ASSESSED: CPT | Performed by: NURSE PRACTITIONER

## 2022-09-13 PROCEDURE — G0439 PPPS, SUBSEQ VISIT: HCPCS | Performed by: NURSE PRACTITIONER

## 2022-09-13 NOTE — PROGRESS NOTES
The ABCs of the Annual Wellness Visit  Subsequent Medicare Wellness Visit    Chief Complaint   Patient presents with   • Medicare Wellness-subsequent      Subjective    History of Present Illness:  Denisse Marino is a 77 y.o. female who presents for a Subsequent Medicare Wellness Visit.    Health goal:  Eat healthy and stay active    The following portions of the patient's history were reviewed and   updated as appropriate: allergies, current medications, past family history, past medical history, past social history, past surgical history and problem list.    Compared to one year ago, the patient feels her physical   health is better.    Compared to one year ago, the patient feels her mental   health is better.    Recent Hospitalizations:  She was not admitted to the hospital during the last year.       Current Medical Providers:  Patient Care Team:  Stefany Bocanegra MD as PCP - General (Family Medicine)  Jonathan Stewart MD as Surgeon (Sports Medicine)    Outpatient Medications Prior to Visit   Medication Sig Dispense Refill   • B Complex Vitamins (VITAMIN-B COMPLEX PO) Take  by mouth Daily.     • Cholecalciferol (VITAMIN D3) 5000 units capsule capsule Take 5,000 Units by mouth Daily.     • Multiple Vitamins-Minerals (MULTIVITAMIN ADULT PO) Take  by mouth.       No facility-administered medications prior to visit.       No opioid medication identified on active medication list. I have reviewed chart for other potential  high risk medication/s and harmful drug interactions in the elderly.          Aspirin is not on active medication list.  Aspirin use is not indicated based on review of current medical condition/s. Risk of harm outweighs potential benefits.  .    Patient Active Problem List   Diagnosis   • Constipation   • DDD (degenerative disc disease), lumbar   • Chronic low back pain   • Numbness   • Neuropathic pain     Advance Care Planning  Advance Directive is not on file.  ACP discussion was held with the  "patient during this visit. Patient does not have an advance directive, information provided.    Review of Systems   Constitutional: Positive for activity change and appetite change. Negative for fatigue.   HENT: Negative for congestion, hearing loss, sore throat and trouble swallowing.    Eyes: Negative for pain and visual disturbance.   Respiratory: Negative for cough and shortness of breath.    Cardiovascular: Negative for chest pain and palpitations.   Gastrointestinal: Negative for abdominal pain and blood in stool.   Genitourinary: Negative for difficulty urinating and hematuria.   Musculoskeletal: Negative for gait problem.   Skin: Negative for rash and wound.   Neurological: Negative for dizziness.   Psychiatric/Behavioral: Negative for dysphoric mood and sleep disturbance. The patient is not nervous/anxious.         Objective    Vitals:    09/13/22 0818   BP: 175/66   Pulse: 67   Temp: 96.9 °F (36.1 °C)   TempSrc: Temporal   SpO2: 100%   Weight: 59.1 kg (130 lb 6.4 oz)   Height: 165.1 cm (65\")     Estimated body mass index is 21.7 kg/m² as calculated from the following:    Height as of this encounter: 165.1 cm (65\").    Weight as of this encounter: 59.1 kg (130 lb 6.4 oz).    BMI is within normal parameters. No other follow-up for BMI required.      Does the patient have evidence of cognitive impairment? No    Physical Exam  Vitals and nursing note reviewed.   Constitutional:       General: She is not in acute distress.     Appearance: She is well-developed. She is not ill-appearing.   HENT:      Head: Normocephalic and atraumatic.      Right Ear: Tympanic membrane, ear canal and external ear normal.      Left Ear: Tympanic membrane, ear canal and external ear normal.      Mouth/Throat:      Mouth: Mucous membranes are moist.      Pharynx: Uvula midline. No posterior oropharyngeal erythema.   Eyes:      General: No scleral icterus.        Right eye: No discharge.         Left eye: No discharge.      " Conjunctiva/sclera: Conjunctivae normal.      Pupils: Pupils are equal, round, and reactive to light.   Neck:      Thyroid: No thyromegaly.   Cardiovascular:      Rate and Rhythm: Normal rate and regular rhythm.      Heart sounds: Normal heart sounds. No murmur heard.  Pulmonary:      Effort: Pulmonary effort is normal.      Breath sounds: Normal breath sounds.   Abdominal:      General: Bowel sounds are normal.      Palpations: Abdomen is soft.      Tenderness: There is no abdominal tenderness.   Musculoskeletal:         General: No deformity.      Cervical back: Neck supple.      Comments: Gait smooth and steady   Lymphadenopathy:      Cervical: No cervical adenopathy.   Skin:     General: Skin is warm and dry.   Neurological:      General: No focal deficit present.      Mental Status: She is alert and oriented to person, place, and time.   Psychiatric:         Attention and Perception: Attention and perception normal.         Mood and Affect: Mood and affect normal.         Speech: Speech normal.         Behavior: Behavior normal. Behavior is cooperative.         Thought Content: Thought content normal.         Cognition and Memory: Cognition normal.      Comments: Very pleasant, conversant, good medical historian       Lab Results   Component Value Date    CHLPL 202 (H) 09/13/2022    TRIG 68 09/13/2022    HDL 79 (H) 09/13/2022     (H) 09/13/2022    VLDL 12 09/13/2022    HGBA1C 6.10 (H) 09/13/2022            HEALTH RISK ASSESSMENT    Smoking Status:  Social History     Tobacco Use   Smoking Status Never Smoker   Smokeless Tobacco Never Used     Alcohol Consumption:  Social History     Substance and Sexual Activity   Alcohol Use Yes   • Alcohol/week: 1.0 standard drink   • Types: 1 Glasses of wine per week    Comment: several times per week     Fall Risk Screen:    STEADI Fall Risk Assessment was completed, and patient is at LOW risk for falls.Assessment completed on:9/13/2022    Depression  Screening:  PHQ-2/PHQ-9 Depression Screening 9/13/2022   Retired Total Score -   Little Interest or Pleasure in Doing Things 0-->not at all   Feeling Down, Depressed or Hopeless 0-->not at all   PHQ-9: Brief Depression Severity Measure Score 0       Health Habits and Functional and Cognitive Screening:  Functional & Cognitive Status 9/13/2022   Do you have difficulty preparing food and eating? No   Do you have difficulty bathing yourself, getting dressed or grooming yourself? No   Do you have difficulty using the toilet? No   Do you have difficulty moving around from place to place? No   Do you have trouble with steps or getting out of a bed or a chair? No   Current Diet Well Balanced Diet   Dental Exam Up to date   Eye Exam Up to date   Exercise (times per week) 7 times per week   Current Exercises Include Walking   Current Exercise Activities Include -   Do you need help using the phone?  No   Are you deaf or do you have serious difficulty hearing?  Yes   Do you need help with transportation? No   Do you need help shopping? No   Do you need help preparing meals?  No   Do you need help with housework?  No   Do you need help with laundry? No   Do you need help taking your medications? No   Do you need help managing money? No   Do you ever drive or ride in a car without wearing a seat belt? No   Have you felt unusual stress, anger or loneliness in the last month? No   Who do you live with? Spouse   If you need help, do you have trouble finding someone available to you? No   Have you been bothered in the last four weeks by sexual problems? No   Do you have difficulty concentrating, remembering or making decisions? No       Age-appropriate Screening Schedule:  Refer to the list below for future screening recommendations based on patient's age, sex and/or medical conditions. Orders for these recommended tests are listed in the plan section. The patient has been provided with a written plan.    Health Maintenance   Topic  Date Due   • DXA SCAN  Never done   • MAMMOGRAM  05/13/2021   • ZOSTER VACCINE (1 of 2) 09/15/2022 (Originally 6/14/1997)   • INFLUENZA VACCINE  10/01/2022   • TDAP/TD VACCINES (2 - Td or Tdap) 10/26/2031              Assessment & Plan   CMS Preventative Services Quick Reference  Risk Factors Identified During Encounter  Immunizations Discussed/Encouraged (specific Immunizations; Prevnar 20 (Pneumococcal 20-valent conjugate), Shingrix and COVID19  The above risks/problems have been discussed with the patient.  Follow up actions/plans if indicated are seen below in the Assessment/Plan Section.  Pertinent information has been shared with the patient in the After Visit Summary.    Diagnoses and all orders for this visit:    1. Medicare annual wellness visit, subsequent (Primary)    2. Encounter for screening mammogram for malignant neoplasm of breast  -     Mammo Screening Bilateral With CAD; Future    3. Screening for osteoporosis  -     DEXA Bone Density Axial; Future    4. Post-menopausal  -     DEXA Bone Density Axial; Future    5. Hyperglycemia  -     Hemoglobin A1c    6. Screening for lipoid disorders  -     Lipid Panel With LDL / HDL Ratio    7. Elevated blood pressure reading in office without diagnosis of hypertension  -     CBC & Differential  -     Comprehensive Metabolic Panel      Appropriate health maintenance and prevention topics specific for this patient were discussed today.  Additionally, health goals, and health concerns addressed as appropriate.  Pt was encouraged to stay up to date on recommended screenings and vaccines based on USPSTF guidelines.     BP is a little elevated today in office, which is not typical for pt, Recommend home monitoring and let PCP know readings.        Follow Up:   Return in about 6 months (around 3/13/2023) for Recheck.     An After Visit Summary and PPPS were made available to the patient.

## 2022-09-14 LAB
ALBUMIN SERPL-MCNC: 5 G/DL (ref 3.5–5.2)
ALBUMIN/GLOB SERPL: 2.3 G/DL
ALP SERPL-CCNC: 82 U/L (ref 39–117)
ALT SERPL-CCNC: 16 U/L (ref 1–33)
AST SERPL-CCNC: 24 U/L (ref 1–32)
BASOPHILS # BLD AUTO: 0.04 10*3/MM3 (ref 0–0.2)
BASOPHILS NFR BLD AUTO: 0.8 % (ref 0–1.5)
BILIRUB SERPL-MCNC: 0.7 MG/DL (ref 0–1.2)
BUN SERPL-MCNC: 18 MG/DL (ref 8–23)
BUN/CREAT SERPL: 24.3 (ref 7–25)
CALCIUM SERPL-MCNC: 9.9 MG/DL (ref 8.6–10.5)
CHLORIDE SERPL-SCNC: 103 MMOL/L (ref 98–107)
CHOLEST SERPL-MCNC: 202 MG/DL (ref 0–200)
CO2 SERPL-SCNC: 29 MMOL/L (ref 22–29)
CREAT SERPL-MCNC: 0.74 MG/DL (ref 0.57–1)
EGFRCR-CYS SERPLBLD CKD-EPI 2021: 84.5 ML/MIN/1.73
EOSINOPHIL # BLD AUTO: 0.07 10*3/MM3 (ref 0–0.4)
EOSINOPHIL NFR BLD AUTO: 1.4 % (ref 0.3–6.2)
ERYTHROCYTE [DISTWIDTH] IN BLOOD BY AUTOMATED COUNT: 13.1 % (ref 12.3–15.4)
GLOBULIN SER CALC-MCNC: 2.2 GM/DL
GLUCOSE SERPL-MCNC: 107 MG/DL (ref 65–99)
HBA1C MFR BLD: 6.1 % (ref 4.8–5.6)
HCT VFR BLD AUTO: 41.7 % (ref 34–46.6)
HDLC SERPL-MCNC: 79 MG/DL (ref 40–60)
HGB BLD-MCNC: 13.8 G/DL (ref 12–15.9)
IMM GRANULOCYTES # BLD AUTO: 0.01 10*3/MM3 (ref 0–0.05)
IMM GRANULOCYTES NFR BLD AUTO: 0.2 % (ref 0–0.5)
LDLC SERPL CALC-MCNC: 111 MG/DL (ref 0–100)
LDLC/HDLC SERPL: 1.38 {RATIO}
LYMPHOCYTES # BLD AUTO: 1.59 10*3/MM3 (ref 0.7–3.1)
LYMPHOCYTES NFR BLD AUTO: 31.6 % (ref 19.6–45.3)
MCH RBC QN AUTO: 30.8 PG (ref 26.6–33)
MCHC RBC AUTO-ENTMCNC: 33.1 G/DL (ref 31.5–35.7)
MCV RBC AUTO: 93.1 FL (ref 79–97)
MONOCYTES # BLD AUTO: 0.29 10*3/MM3 (ref 0.1–0.9)
MONOCYTES NFR BLD AUTO: 5.8 % (ref 5–12)
NEUTROPHILS # BLD AUTO: 3.03 10*3/MM3 (ref 1.7–7)
NEUTROPHILS NFR BLD AUTO: 60.2 % (ref 42.7–76)
NRBC BLD AUTO-RTO: 0 /100 WBC (ref 0–0.2)
PLATELET # BLD AUTO: 255 10*3/MM3 (ref 140–450)
POTASSIUM SERPL-SCNC: 4.6 MMOL/L (ref 3.5–5.2)
PROT SERPL-MCNC: 7.2 G/DL (ref 6–8.5)
RBC # BLD AUTO: 4.48 10*6/MM3 (ref 3.77–5.28)
SODIUM SERPL-SCNC: 142 MMOL/L (ref 136–145)
TRIGL SERPL-MCNC: 68 MG/DL (ref 0–150)
VLDLC SERPL CALC-MCNC: 12 MG/DL (ref 5–40)
WBC # BLD AUTO: 5.03 10*3/MM3 (ref 3.4–10.8)

## 2022-09-15 ENCOUNTER — TRANSCRIBE ORDERS (OUTPATIENT)
Dept: ADMINISTRATIVE | Facility: HOSPITAL | Age: 77
End: 2022-09-15

## 2022-09-15 DIAGNOSIS — Z12.31 VISIT FOR SCREENING MAMMOGRAM: Primary | ICD-10-CM

## 2022-12-30 ENCOUNTER — HOSPITAL ENCOUNTER (OUTPATIENT)
Dept: BONE DENSITY | Facility: HOSPITAL | Age: 77
Discharge: HOME | End: 2022-12-30

## 2022-12-30 ENCOUNTER — HOSPITAL ENCOUNTER (OUTPATIENT)
Dept: MAMMOGRAPHY | Facility: HOSPITAL | Age: 77
Discharge: HOME | End: 2022-12-30

## 2022-12-30 DIAGNOSIS — Z13.820 SCREENING FOR OSTEOPOROSIS: ICD-10-CM

## 2022-12-30 DIAGNOSIS — Z78.0 POST-MENOPAUSAL: ICD-10-CM

## 2022-12-30 DIAGNOSIS — Z12.31 VISIT FOR SCREENING MAMMOGRAM: ICD-10-CM

## 2022-12-30 PROCEDURE — 77067 SCR MAMMO BI INCL CAD: CPT

## 2022-12-30 PROCEDURE — 77080 DXA BONE DENSITY AXIAL: CPT

## 2022-12-30 PROCEDURE — 77063 BREAST TOMOSYNTHESIS BI: CPT

## 2023-05-16 NOTE — PROGRESS NOTES
"Chief Complaint  Establish Care (New Pt )    Subjective          Denisse presents to [facility] for a new patient visit. She previously was cared for by another provider in our system, but now will be transferring care to our practice.    HTN: Concerned about taking meds but willing to take if needed  Very active, hikes, walks regularly  Diet is pretty good, strives to eat healthfully. Does like sweets, usually has one sweet treat per day. Eats little red meat, mostly fish and chicken, lots of veggies, no added salt.   Diabetes runs in the family. Pt has pre-DM  Takes Centrum for Women, but no dedicated Vit D supplement    Mammogram:  Mammo Screening Digital Tomosynthesis Bilateral With CAD (12/30/2022 14:31)    Colonoscopy: Cannot recall exact year, got somewhere [place] [address]. Believes not due until approx 2027 but needs to check records.     Objective   Vital Signs:   Vitals:    05/23/23 1015   BP: 152/98   Pulse: 70   SpO2: 100%   Weight: 56.7 kg (125 lb)   Height: 165.1 cm (65\")        BMI is within normal parameters. No other follow-up for BMI required.        Physical Exam  Constitutional:       General: She is not in acute distress.     Appearance: Normal appearance. She is not toxic-appearing.   HENT:      Head: Normocephalic and atraumatic.      Mouth/Throat:      Mouth: Mucous membranes are moist.   Eyes:      General: No scleral icterus.     Conjunctiva/sclera: Conjunctivae normal.   Cardiovascular:      Rate and Rhythm: Normal rate and regular rhythm.      Heart sounds: Normal heart sounds. No murmur heard.    No friction rub. No gallop.   Pulmonary:      Effort: Pulmonary effort is normal. No respiratory distress.      Breath sounds: Normal breath sounds.   Musculoskeletal:         General: No swelling, tenderness or deformity. Normal range of motion.      Cervical back: Normal range of motion and neck supple.   Skin:     General: Skin is warm and dry.      Findings: " No lesion or rash.   Neurological:      General: No focal deficit present.      Mental Status: She is alert and oriented to person, place, and time.   Psychiatric:         Mood and Affect: Mood normal.         Behavior: Behavior normal.         Judgment: Judgment normal.          Result Review :     The following data was reviewed by: Jessica Vallecillo MD on 05/23/2023:  Progress Notes by Stefany Bocanegra MD (04/11/2022 07:45)  CBC & Differential (09/13/2022 09:17)  Comprehensive Metabolic Panel (09/13/2022 09:17)  Hemoglobin A1c (09/13/2022 09:17)  Lipid Panel With LDL / HDL Ratio (09/13/2022 09:17)  Mammo Screening Digital Tomosynthesis Bilateral With CAD (12/30/2022 14:31)- Normal  DEXA Bone Density Axial (12/30/2022 14:43)- Osteopenia       Assessment and Plan    Diagnoses and all orders for this visit:    1. Primary hypertension (Primary)  Assessment & Plan:  Patient has had multiple elevated blood pressures in the office setting.  Patient prefers to avoid medications, but she also is doing a great job already on lifestyle interventions such as exercise and healthy diet.  Referring to nutritionist to ensure there is nothing else she needs to do to optimize diet for hypertension, hyperlipidemia, prediabetes and osteopenia--but do not think that these interventions will lead to normal blood pressure.  Per the ACC guidelines and due to the fact that patient is a very healthy 77-year-old, recommend striving for a blood pressure goal of 120/80 if patient able to tolerate it to prevent ASCVD events down the road.  Check BMP, if potassium and renal function are normal, will plan to initiate on losartan.  Discussed keeping home blood pressure log. RTC 1 month for BP check and labs.    Orders:  -     Basic metabolic panel  -     Ambulatory Referral to Nutrition Services    2. Prediabetes  Assessment & Plan:  Diet overall healthy, but patient does eat sweets fairly frequently.  Recommended cutting back on sweet treat  consumption to perhaps 4 days a week instead of daily.    Orders:  -     Ambulatory Referral to Nutrition Services    3. Osteopenia, unspecified location  Assessment & Plan:  Continue weightbearing exercise  Recommend at least 1000 international units of vitamin D daily and 1600 mg of calcium daily    Orders:  -     Ambulatory Referral to Nutrition Services    4. Routine health maintenance  Assessment & Plan:  Recommended patient get Shingrix vaccine at local pharmacy  I reviewed pneumococcal vaccine history, looks like she got the Prevnar 13 previously but not Prevnar 20 or 23.  Recommended she check with her pharmacy if she did indeed get the Prevnar 20, and if not, strongly recommended getting that updated  Recommended patient check her records to see when her next colonoscopy is due.  I cannot find a colonoscopy report and patient cannot recall exactly where she got it.  Mammogram will be due end of December 2023  DEXA will be due end of December 2024          Follow Up   Return in about 4 weeks (around 6/20/2023) for Recheck, Next scheduled follow up.  Patient was given instructions and counseling regarding her condition or for health maintenance advice. Please see specific information pulled into the AVS if appropriate.

## 2023-05-23 ENCOUNTER — OFFICE VISIT (OUTPATIENT)
Dept: FAMILY MEDICINE CLINIC | Facility: CLINIC | Age: 78
End: 2023-05-23
Payer: MEDICARE

## 2023-05-23 VITALS
HEIGHT: 65 IN | WEIGHT: 125 LBS | OXYGEN SATURATION: 100 % | HEART RATE: 70 BPM | DIASTOLIC BLOOD PRESSURE: 98 MMHG | SYSTOLIC BLOOD PRESSURE: 152 MMHG | BODY MASS INDEX: 20.83 KG/M2

## 2023-05-23 DIAGNOSIS — I10 PRIMARY HYPERTENSION: Primary | ICD-10-CM

## 2023-05-23 DIAGNOSIS — M85.80 OSTEOPENIA, UNSPECIFIED LOCATION: ICD-10-CM

## 2023-05-23 DIAGNOSIS — R73.03 PREDIABETES: ICD-10-CM

## 2023-05-23 DIAGNOSIS — Z00.00 ROUTINE HEALTH MAINTENANCE: ICD-10-CM

## 2023-05-23 NOTE — ASSESSMENT & PLAN NOTE
Recommended patient get Shingrix vaccine at local pharmacy  I reviewed pneumococcal vaccine history, looks like she got the Prevnar 13 previously but not Prevnar 20 or 23.  Recommended she check with her pharmacy if she did indeed get the Prevnar 20, and if not, strongly recommended getting that updated  Recommended patient check her records to see when her next colonoscopy is due.  I cannot find a colonoscopy report and patient cannot recall exactly where she got it.  Mammogram will be due end of December 2023  DEXA will be due end of December 2024

## 2023-05-23 NOTE — ASSESSMENT & PLAN NOTE
Diet overall healthy, but patient does eat sweets fairly frequently.  Recommended cutting back on sweet treat consumption to perhaps 4 days a week instead of daily.

## 2023-05-23 NOTE — ASSESSMENT & PLAN NOTE
Continue weightbearing exercise  Recommend at least 1000 international units of vitamin D daily and 1600 mg of calcium daily

## 2023-05-23 NOTE — PATIENT INSTRUCTIONS
It was so nice meeting you today. I look forward to working with you on a plan to get/keep you healthy and happy!    Make sure your MV has 1000 international units of Vitamin D in it.    I recommend calcium (1200 mg daily--calcium citrate better than calcium carbonate) and vitamin D3 (at least 1000 IU daily). Calcium intake should be from dietary sources if at all possible, like milk, cheese, spinach, fish, etc.  For optimal bone density, I recommend weight- bearing exercise 5-7 days per week (walking is great!) for at least 30 minutes per session. Avoid excess caffeine and alcohol and if you smoke, I recommend that you stop. The next scheduled bone density exam should be in 2 years.    Home Blood Pressure Instructions:    Sit quietly for 5 minutes. When you measure your blood pressure, make sure your arm is near heart level and that your legs are uncrossed. Check blood pressure. Wait 1-2 minutes and check again. If you have time, wait 1-2 minutes more and check a third time. Record the second and third values in your blood pressure log.

## 2023-05-23 NOTE — ASSESSMENT & PLAN NOTE
Patient has had multiple elevated blood pressures in the office setting.  Patient prefers to avoid medications, but she also is doing a great job already on lifestyle interventions such as exercise and healthy diet.  Referring to nutritionist to ensure there is nothing else she needs to do to optimize diet for hypertension, hyperlipidemia, prediabetes and osteopenia--but do not think that these interventions will lead to normal blood pressure.  Per the ACC guidelines and due to the fact that patient is a very healthy 77-year-old, recommend striving for a blood pressure goal of 120/80 if patient able to tolerate it to prevent ASCVD events down the road.  Check BMP, if potassium and renal function are normal, will plan to initiate on losartan.  Discussed keeping home blood pressure log. RTC 1 month for BP check and labs.

## 2023-05-24 LAB
BUN SERPL-MCNC: 13 MG/DL (ref 8–23)
BUN/CREAT SERPL: 16.9 (ref 7–25)
CALCIUM SERPL-MCNC: 10.4 MG/DL (ref 8.6–10.5)
CHLORIDE SERPL-SCNC: 103 MMOL/L (ref 98–107)
CO2 SERPL-SCNC: 29.4 MMOL/L (ref 22–29)
CREAT SERPL-MCNC: 0.77 MG/DL (ref 0.57–1)
EGFRCR SERPLBLD CKD-EPI 2021: 80.6 ML/MIN/1.73
GLUCOSE SERPL-MCNC: 112 MG/DL (ref 65–99)
POTASSIUM SERPL-SCNC: 4.8 MMOL/L (ref 3.5–5.2)
SODIUM SERPL-SCNC: 141 MMOL/L (ref 136–145)

## 2023-05-24 RX ORDER — AMLODIPINE BESYLATE 5 MG/1
5 TABLET ORAL DAILY
Qty: 30 TABLET | Refills: 1 | Status: SHIPPED | OUTPATIENT
Start: 2023-05-24

## 2023-07-20 DIAGNOSIS — I10 PRIMARY HYPERTENSION: ICD-10-CM

## 2023-07-24 RX ORDER — AMLODIPINE BESYLATE 5 MG/1
TABLET ORAL
Qty: 30 TABLET | Refills: 0 | Status: SHIPPED | OUTPATIENT
Start: 2023-07-24

## 2023-12-13 ENCOUNTER — OFFICE VISIT (OUTPATIENT)
Dept: FAMILY MEDICINE CLINIC | Facility: CLINIC | Age: 78
End: 2023-12-13
Payer: MEDICARE

## 2023-12-13 VITALS
BODY MASS INDEX: 22.33 KG/M2 | SYSTOLIC BLOOD PRESSURE: 130 MMHG | HEART RATE: 88 BPM | HEIGHT: 65 IN | WEIGHT: 134 LBS | DIASTOLIC BLOOD PRESSURE: 82 MMHG | RESPIRATION RATE: 16 BRPM | OXYGEN SATURATION: 96 %

## 2023-12-13 DIAGNOSIS — I10 PRIMARY HYPERTENSION: Primary | ICD-10-CM

## 2023-12-13 DIAGNOSIS — R73.03 PREDIABETES: ICD-10-CM

## 2023-12-13 DIAGNOSIS — E55.9 VITAMIN D DEFICIENCY: ICD-10-CM

## 2023-12-13 PROCEDURE — 99214 OFFICE O/P EST MOD 30 MIN: CPT | Performed by: NURSE PRACTITIONER

## 2023-12-13 PROCEDURE — 3079F DIAST BP 80-89 MM HG: CPT | Performed by: NURSE PRACTITIONER

## 2023-12-13 PROCEDURE — 3075F SYST BP GE 130 - 139MM HG: CPT | Performed by: NURSE PRACTITIONER

## 2023-12-13 RX ORDER — AMLODIPINE BESYLATE 5 MG/1
5 TABLET ORAL DAILY
Qty: 90 TABLET | Refills: 2 | Status: SHIPPED | OUTPATIENT
Start: 2023-12-13 | End: 2024-06-10

## 2023-12-13 NOTE — PROGRESS NOTES
"Chief Complaint  Hypertension    Subjective          Denisse presents to [facility] as a 78 year old female for follow up on hypertension, for medication refills , review labs.  Overall doing well    Hypertension has been controlled on current medication.  She has not taken her dose yet today.  She denies any increase or changes in headaches-no blurred vision no dizziness no flushing no chest pain or pressure    She has had total hip replacement since last visit.  She is doing well since that time.  She did have labs completed prior to and after her surgery.  She has completed all of her physical therapy.  She is doing very well    She has no other acute complaints at today    The following portions of the patient's history were reviewed and updated as appropriate: allergies, current medications, past family history, past medical history, past social history, past surgical history, and problem list        Review of Systems   Constitutional:  Negative for chills, fatigue and fever.   Eyes:  Negative for visual disturbance.   Respiratory:  Negative for cough, shortness of breath and wheezing.    Neurological:  Negative for dizziness and light-headedness.        Objective   Vital Signs:   Vitals:    12/13/23 0807   BP: 130/82   Pulse: 88   Resp: 16   SpO2: 96%   Weight: 60.8 kg (134 lb)   Height: 165.1 cm (65\")        BMI is within normal parameters. No other follow-up for BMI required.        Physical Exam  Vitals reviewed.   Constitutional:       General: She is not in acute distress.  Eyes:      Conjunctiva/sclera: Conjunctivae normal.   Neck:      Thyroid: No thyromegaly.      Vascular: No carotid bruit.   Cardiovascular:      Rate and Rhythm: Normal rate and regular rhythm.      Heart sounds: Normal heart sounds.   Pulmonary:      Effort: Pulmonary effort is normal. No respiratory distress.      Breath sounds: Normal breath sounds. No stridor. No wheezing, rhonchi or rales.   Chest: "      Chest wall: No tenderness.   Lymphadenopathy:      Cervical: No cervical adenopathy.   Neurological:      Mental Status: She is alert and oriented to person, place, and time.   Psychiatric:         Attention and Perception: Attention normal.         Mood and Affect: Mood normal.          Result Review :     The following data was reviewed by: ANISH Christensen on 12/13/2023:  CBC AND DIFFERENTIAL (08/31/2023 14:32) H&H normal 14.4/42.4    PT AND PTT (08/31/2023 14:32) normal  COMPREHENSIVE METABOLIC PANEL (08/31/2023 14:32) normal   HEMOGLOBIN A1C (08/31/2023 14:32) 5.8  VITAMIN D,25-HYDROXY (08/31/2023 14:32) 36 normal  Assessment and Plan    Diagnoses and all orders for this visit:    1. Primary hypertension (Primary)  Comments:  Continue to monitor blood pressure at home bring log to next visit  Kidney function normal continue amlodipine  Orders:  -     amLODIPine (NORVASC) 5 MG tablet; Take 1 tablet by mouth Daily for 180 days.  Dispense: 90 tablet; Refill: 2  -     Comprehensive Metabolic Panel  -     CBC & Differential  -     Lipid Panel  -     Hemoglobin A1c  -     TSH  -     T4, Free  -     Vitamin D,25-Hydroxy    2. Prediabetes  Comments:  Watch carb and sugar intake  We will repeat labs with next visit  Orders:  -     Comprehensive Metabolic Panel  -     CBC & Differential  -     Lipid Panel  -     Hemoglobin A1c  -     TSH  -     T4, Free  -     Vitamin D,25-Hydroxy    3. Vitamin D deficiency  Comments:  Recheck vitamin D with next labs  Orders:  -     Vitamin D,25-Hydroxy        Follow Up   Return in about 6 months (around 6/13/2024) for Medicare Wellness.  Patient was given instructions and counseling regarding her condition or for health maintenance advice. Please see specific information pulled into the AVS if appropriate.

## 2024-04-09 ENCOUNTER — TELEPHONE (OUTPATIENT)
Dept: FAMILY MEDICINE CLINIC | Facility: CLINIC | Age: 79
End: 2024-04-09
Payer: COMMERCIAL

## 2025-01-13 ENCOUNTER — TELEPHONE (OUTPATIENT)
Dept: FAMILY MEDICINE CLINIC | Facility: CLINIC | Age: 80
End: 2025-01-13
Payer: COMMERCIAL

## 2025-01-13 RX ORDER — AMLODIPINE BESYLATE 5 MG/1
5 TABLET ORAL DAILY
Qty: 30 TABLET | Refills: 0 | Status: SHIPPED | OUTPATIENT
Start: 2025-01-13

## 2025-01-13 NOTE — TELEPHONE ENCOUNTER
Caller: Denisse Marino    Relationship: Self    Best call back number: [phone]     What medication are you requesting: AMLODIPINE    What are your current symptoms: BLOOD PRESSURE    How long have you been experiencing symptoms: ONGOING    Have you had these symptoms before:    [x] Yes  [] No    Have you been treated for these symptoms before:   [x] Yes  [] No    If a prescription is needed, what is your preferred pharmacy and phone number: [facility] [id number] - [address] - [phone]  - [phone]      Additional notes: PATIENT IS USING A NEW PHARMACY DUE TO NEW INSURANCE AND IS NEEDING A NEW PRESCRIPTION SENT TO NEW PHARMACY FOR A REFILL, AND IT IS SET ON A 90 DAY SUPPLY.     PATIENT HAS APPOINTMENT WITH [facility]ER[facility] ON 01/28/25 AND NEEDS TO BE ADDED AS HER PRIMARY CARE PROVIDER.     PLEASE CALL TO DISCUSS AND ADVISE.

## 2025-01-20 RX ORDER — AMLODIPINE BESYLATE 5 MG/1
5 TABLET ORAL DAILY
Qty: 90 TABLET | OUTPATIENT
Start: 2025-01-20

## 2025-01-20 NOTE — TELEPHONE ENCOUNTER
Duplicate   Rx Refill Note  Requested Prescriptions     Pending Prescriptions Disp Refills    amLODIPine (NORVASC) 5 MG tablet [Pharmacy Med Name: amLODIPine BESYLATE 5 MG TAB] 90 tablet      Sig: TAKE 1 TABLET BY MOUTH DAILY      Last office visit with prescribing clinician: 12/13/2023   Last telemedicine visit with prescribing clinician: Visit date not found   Next office visit with prescribing clinician: 1/28/2025                         Would you like a call back once the refill request has been completed: [] Yes [] No    If the office needs to give you a call back, can they leave a voicemail: [] Yes [] No    Guille Saldaña MA  01/20/25, 08:47 EST

## 2025-01-28 ENCOUNTER — OFFICE VISIT (OUTPATIENT)
Dept: FAMILY MEDICINE CLINIC | Facility: CLINIC | Age: 80
End: 2025-01-28
Payer: MEDICARE

## 2025-01-28 VITALS
WEIGHT: 127.6 LBS | SYSTOLIC BLOOD PRESSURE: 130 MMHG | OXYGEN SATURATION: 99 % | DIASTOLIC BLOOD PRESSURE: 70 MMHG | BODY MASS INDEX: 21.26 KG/M2 | HEART RATE: 78 BPM | HEIGHT: 65 IN | TEMPERATURE: 96.6 F

## 2025-01-28 DIAGNOSIS — E55.9 VITAMIN D DEFICIENCY: ICD-10-CM

## 2025-01-28 DIAGNOSIS — R73.03 PREDIABETES: ICD-10-CM

## 2025-01-28 DIAGNOSIS — Z00.00 MEDICARE ANNUAL WELLNESS VISIT, SUBSEQUENT: Primary | ICD-10-CM

## 2025-01-28 DIAGNOSIS — B35.1 NAIL FUNGUS: ICD-10-CM

## 2025-01-28 DIAGNOSIS — I10 PRIMARY HYPERTENSION: ICD-10-CM

## 2025-01-28 DIAGNOSIS — Z12.31 ENCOUNTER FOR SCREENING MAMMOGRAM FOR MALIGNANT NEOPLASM OF BREAST: ICD-10-CM

## 2025-01-28 PROCEDURE — 99214 OFFICE O/P EST MOD 30 MIN: CPT | Performed by: NURSE PRACTITIONER

## 2025-01-28 PROCEDURE — G0439 PPPS, SUBSEQ VISIT: HCPCS | Performed by: NURSE PRACTITIONER

## 2025-01-28 RX ORDER — CICLOPIROX 80 MG/ML
SOLUTION TOPICAL NIGHTLY
Qty: 6 ML | Refills: 1 | Status: SHIPPED | OUTPATIENT
Start: 2025-01-28

## 2025-01-28 RX ORDER — AMLODIPINE BESYLATE 5 MG/1
5 TABLET ORAL DAILY
Qty: 90 TABLET | Refills: 2 | Status: SHIPPED | OUTPATIENT
Start: 2025-01-28

## 2025-01-28 NOTE — ASSESSMENT & PLAN NOTE
Recheck Ha1c with labs-  no labs since 8/2023  Orders:    Comprehensive metabolic panel    Lipid panel    CBC and Differential    TSH    Hemoglobin A1c

## 2025-01-28 NOTE — ASSESSMENT & PLAN NOTE
Refill medication    Stable-  continue amlodipine      Orders:    Comprehensive metabolic panel    Lipid panel    CBC and Differential    TSH

## 2025-01-28 NOTE — PROGRESS NOTES
Subjective   The ABCs of the Annual Wellness Visit  Medicare Wellness Visit      Denisse Marino is a 79 y.o. patient who presents for a Medicare Wellness Visit.    The following portions of the patient's history were reviewed and   updated as appropriate: allergies, current medications, past family history, past medical history, past social history, past surgical history, and problem list.    Compared to one year ago, the patient's physical   health is better.  Compared to one year ago, the patient's mental   health is better.    Recent Hospitalizations:  She was not admitted to the hospital during the last year.     Current Medical Providers:  Patient Care Team:  Aubree Clark APRN as PCP - General (Nurse Practitioner)  Jonathan Stewart MD as Surgeon (Sports Medicine)  Lavell Hutchison-  Ortho    Outpatient Medications Prior to Visit   Medication Sig Dispense Refill    Calcium Carbonate-Vit D-Min (CALCIUM 1200 PO) Take  by mouth.      Multiple Vitamins-Minerals (MULTIVITAMIN ADULT PO) Take  by mouth.      amLODIPine (NORVASC) 5 MG tablet Take 1 tablet by mouth Daily. 30 tablet 0    amoxicillin-clavulanate (AUGMENTIN) 875-125 MG per tablet Take 1 tablet by mouth Every 12 (Twelve) Hours. One PO BID for infection with food (Patient not taking: Reported on 1/28/2025) 20 tablet 0     No facility-administered medications prior to visit.     No opioid medication identified on active medication list. I have reviewed chart for other potential  high risk medication/s and harmful drug interactions in the elderly.      Aspirin is not on active medication list.  Aspirin use is not indicated based on review of current medical condition/s. Risk of harm outweighs potential benefits.  .    Patient Active Problem List   Diagnosis    Constipation    DDD (degenerative disc disease), lumbar    Chronic low back pain    Numbness    Neuropathic pain    Primary hypertension    Prediabetes    Osteopenia    Routine health maintenance  "    Advance Care Planning Advance Directive is not on file.  ACP discussion was held with the patient during this visit. Patient does not have an advance directive, information provided.            Objective   Vitals:    25 1402   BP: 130/70   Pulse: 78   Temp: 96.6 °F (35.9 °C)   SpO2: 99%   Weight: 57.9 kg (127 lb 9.6 oz)   Height: 165.1 cm (65\")   PainSc: 0-No pain       Estimated body mass index is 21.23 kg/m² as calculated from the following:    Height as of this encounter: 165.1 cm (65\").    Weight as of this encounter: 57.9 kg (127 lb 9.6 oz).    BMI is within normal parameters. No other follow-up for BMI required.           Does the patient have evidence of cognitive impairment? No                                                                                                Health  Risk Assessment    Smoking Status:  Social History     Tobacco Use   Smoking Status Never   Smokeless Tobacco Never     Alcohol Consumption:  Social History     Substance and Sexual Activity   Alcohol Use Yes    Alcohol/week: 1.0 standard drink of alcohol    Types: 1 Glasses of wine per week    Comment: several times per week       Fall Risk Screen  STEADI Fall Risk Assessment was completed, and patient is at LOW risk for falls.Assessment completed on:2025    Depression Screening   Little interest or pleasure in doing things? Not at all   Feeling down, depressed, or hopeless? Not at all   PHQ-2 Total Score 0      Health Habits and Functional and Cognitive Screenin/28/2025     2:03 PM   Functional & Cognitive Status   Do you have difficulty preparing food and eating? No   Do you have difficulty bathing yourself, getting dressed or grooming yourself? No   Do you have difficulty using the toilet? No   Do you have difficulty moving around from place to place? No   Do you have trouble with steps or getting out of a bed or a chair? No   Current Diet Well Balanced Diet   Dental Exam Up to date   Eye Exam Up to date "   Exercise (times per week) 7 times per week   Current Exercises Include Walking   Do you need help using the phone?  No   Are you deaf or do you have serious difficulty hearing?  No   Do you need help to go to places out of walking distance? No   Do you need help shopping? No   Do you need help preparing meals?  No   Do you need help with housework?  No   Do you need help with laundry? No   Do you need help taking your medications? No   Do you need help managing money? No   Do you ever drive or ride in a car without wearing a seat belt? No   Have you felt unusual stress, anger or loneliness in the last month? No   Who do you live with? Spouse   If you need help, do you have trouble finding someone available to you? No   Have you been bothered in the last four weeks by sexual problems? No   Do you have difficulty concentrating, remembering or making decisions? No           Age-appropriate Screening Schedule:  Refer to the list below for future screening recommendations based on patient's age, sex and/or medical conditions. Orders for these recommended tests are listed in the plan section. The patient has been provided with a written plan.    Health Maintenance List  Health Maintenance   Topic Date Due    ZOSTER VACCINE (1 of 2) 01/28/2025 (Originally 6/14/1997)    COVID-19 Vaccine (4 - 2024-25 season) 01/30/2025 (Originally 9/1/2024)    RSV Vaccine - Adults (1 - 1-dose 75+ series) 01/28/2026 (Originally 6/14/2022)    ANNUAL WELLNESS VISIT  01/28/2026    DXA SCAN  01/28/2027    COLORECTAL CANCER SCREENING  11/14/2027    TDAP/TD VACCINES (2 - Td or Tdap) 10/26/2031    HEPATITIS C SCREENING  Completed    INFLUENZA VACCINE  Completed    Pneumococcal Vaccine 65+  Completed    MAMMOGRAM  Discontinued                                                                                                                                                CMS Preventative Services Quick Reference  Risk Factors Identified During  Encounter  Hearing Problem:  wears hearing aids-  follow up annually  Immunizations Discussed/Encouraged: Prevnar 20 (Pneumococcal 20-valent conjugate), Shingrix, COVID19, and RSV (Respiratory Syncytial Virus)  Dental Screening Recommended  Vision Screening Recommended    The above risks/problems have been discussed with the patient.  Pertinent information has been shared with the patient in the After Visit Summary.  An After Visit Summary and PPPS were made available to the patient.    Follow Up:   Next Medicare Wellness visit to be scheduled in 1 year.         Additional E&M Note during same encounter follows:  Patient has additional, significant, and separately identifiable condition(s)/problem(s) that require work above and beyond the Medicare Wellness Visit     Chief Complaint  Medicare Wellness-subsequent and Nail Problem    Subjective   HPI  Denisse is also being seen today for an annual adult preventative physical exam.     Review of Systems   Constitutional:  Negative for chills, fatigue and fever.   Eyes:  Negative for visual disturbance.   Respiratory:  Negative for cough, shortness of breath, wheezing and stridor.    Cardiovascular:  Negative for chest pain, palpitations and leg swelling.   Gastrointestinal:  Negative for abdominal distention, abdominal pain, diarrhea, nausea and vomiting.   Endocrine: Negative for polydipsia, polyphagia and polyuria.   Skin:  Positive for color change (nail bed).   Neurological:  Negative for dizziness.   Hematological:  Negative for adenopathy. Does not bruise/bleed easily.   Psychiatric/Behavioral:  Negative for self-injury, sleep disturbance and suicidal ideas. The patient is not nervous/anxious.       Hypertension-  has been well controlled on current medication.  Takes medication as prescribed.  Denies any medication side effects.  No increase or changes to headaches no blurred vision no dizziness no flushing no chest pain or pressure    She watches her carb and  "sugar intake    She takes over-the-counter calcium and vitamin D supplement    She is agreeable to screening mammogram    She does have a nail fungal infection on her right middle digit  Nailbed has been raised.  She has tried topical over-the-counter medication.    No recent labs to review    She has no other acute complaints today    The following portions of the patient's history were reviewed and updated as appropriate: allergies, current medications, past family history, past medical history, past social history, past surgical history, and problem list            Objective   Vital Signs:  /70   Pulse 78   Temp 96.6 °F (35.9 °C)   Ht 165.1 cm (65\")   Wt 57.9 kg (127 lb 9.6 oz)   SpO2 99%   BMI 21.23 kg/m²   Physical Exam  Constitutional:       General: She is not in acute distress.     Appearance: Normal appearance.   HENT:      Head: Normocephalic.      Right Ear: Tympanic membrane, ear canal and external ear normal. There is no impacted cerumen.      Left Ear: Tympanic membrane, ear canal and external ear normal. There is no impacted cerumen.      Nose: Nose normal. No congestion.      Mouth/Throat:      Mouth: Mucous membranes are moist.      Pharynx: Oropharynx is clear. No oropharyngeal exudate or posterior oropharyngeal erythema.   Eyes:      Extraocular Movements: Extraocular movements intact.      Conjunctiva/sclera: Conjunctivae normal.      Pupils: Pupils are equal, round, and reactive to light.   Cardiovascular:      Rate and Rhythm: Normal rate and regular rhythm.      Pulses: Normal pulses.      Heart sounds: Normal heart sounds. No murmur heard.  Pulmonary:      Effort: Pulmonary effort is normal. No respiratory distress.      Breath sounds: Normal breath sounds. No stridor. No wheezing, rhonchi or rales.   Chest:      Chest wall: No tenderness.   Abdominal:      General: Abdomen is flat. Bowel sounds are normal. There is no distension.      Palpations: Abdomen is soft. There is no " mass.      Tenderness: There is no abdominal tenderness. There is no right CVA tenderness, left CVA tenderness, guarding or rebound.      Hernia: No hernia is present.   Musculoskeletal:         General: Normal range of motion.      Cervical back: Normal range of motion and neck supple.   Lymphadenopathy:      Cervical: No cervical adenopathy.   Skin:     General: Skin is warm.      Capillary Refill: Capillary refill takes less than 2 seconds.      Comments: Raised nail bed right middle digit discolored/ dark   Neurological:      Mental Status: She is alert and oriented to person, place, and time. Mental status is at baseline.   Psychiatric:         Mood and Affect: Mood normal.         Behavior: Behavior normal.         Thought Content: Thought content normal.         Judgment: Judgment normal.         The following data was reviewed by: ANISH Christensen on 01/28/2025:                        Assessment and Plan Additional age appropriate preventative wellness advice topics were discussed during today's preventative wellness exam(some topics already addressed during AWV portion of the note above):    Physical Activity: Advised cardiovascular activity 150 minutes per week as tolerated. (example brisk walk for 30 minutes, 5 days a week).     Nutrition: Discussed nutrition plan with patient. Information shared in after visit summary. Goal is for a well balanced diet to enhance overall health.     Motor Vehicle Safety Discussion:  Wearing Seatbelt While in Motor Vehicle recommendation. Adhering to posted speed limit recommendation.     Injury Prevention Discussion:  Information shared in after visit summary.                 Medicare annual wellness visit, subsequent  Healthy well-balanced diet  Exercise 30 minutes most days of the week  Make sure you get results on any labs or tests we ordered today  We discussed medications and how to take them as prescribed  Sleep 6-8 hours each night if possible  If you have  not signed up for [employer], please activate your code ASAP from your After Visit Summary today     LDL goal <100  LDL goal if heart disease <70  HDL goal >60  Triglyceride goal <150  BP goal =<130/80  Fasting glucose <100    Orders:    Comprehensive metabolic panel    Lipid panel    CBC and Differential    TSH    Primary hypertension  Refill medication    Stable-  continue amlodipine      Orders:    Comprehensive metabolic panel    Lipid panel    CBC and Differential    TSH    Prediabetes  Recheck Ha1c with labs-  no labs since 8/2023  Orders:    Comprehensive metabolic panel    Lipid panel    CBC and Differential    TSH    Hemoglobin A1c    Vitamin D deficiency  Recheck with labs  Continue supplement  Orders:    Comprehensive metabolic panel    Lipid panel    CBC and Differential    TSH    Vitamin D,25-Hydroxy    Encounter for screening mammogram for malignant neoplasm of breast  Mammogram ordered today  Orders:    Mammo Screening Bilateral With CAD; Future    Nail fungus  Trial topical medication-  Need labs for oral                 Follow Up   Return in about 6 months (around 7/28/2025) for Next scheduled follow up.  Patient was given instructions and counseling regarding her condition or for health maintenance advice. Please see specific information pulled into the AVS if appropriate.

## 2025-02-03 ENCOUNTER — TRANSCRIBE ORDERS (OUTPATIENT)
Dept: FAMILY MEDICINE CLINIC | Facility: CLINIC | Age: 80
End: 2025-02-03

## 2025-02-03 DIAGNOSIS — Z12.31 SCREENING MAMMOGRAM FOR BREAST CANCER: Primary | ICD-10-CM

## 2025-02-10 RX ORDER — AMLODIPINE BESYLATE 5 MG/1
5 TABLET ORAL DAILY
Qty: 30 TABLET | OUTPATIENT
Start: 2025-02-10

## 2025-02-12 RX ORDER — AMLODIPINE BESYLATE 5 MG/1
5 TABLET ORAL DAILY
Qty: 30 TABLET | OUTPATIENT
Start: 2025-02-12

## 2025-02-12 NOTE — TELEPHONE ENCOUNTER
Refused   Rx Refill Note  Requested Prescriptions     Pending Prescriptions Disp Refills    amLODIPine (NORVASC) 5 MG tablet [Pharmacy Med Name: amLODIPine BESYLATE 5 MG TAB] 30 tablet      Sig: TAKE 1 TABLET BY MOUTH DAILY      Last office visit with prescribing clinician: 1/28/2025   Last telemedicine visit with prescribing clinician: Visit date not found   Next office visit with prescribing clinician: Visit date not found                         Would you like a call back once the refill request has been completed: [] Yes [] No    If the office needs to give you a call back, can they leave a voicemail: [] Yes [] No    Guille Saldaña MA  02/12/25, 08:39 EST

## 2025-02-13 RX ORDER — AMLODIPINE BESYLATE 5 MG/1
5 TABLET ORAL DAILY
Qty: 30 TABLET | OUTPATIENT
Start: 2025-02-13

## 2025-03-28 ENCOUNTER — RESULTS FOLLOW-UP (OUTPATIENT)
Dept: FAMILY MEDICINE CLINIC | Facility: CLINIC | Age: 80
End: 2025-03-28
Payer: COMMERCIAL

## 2025-03-28 ENCOUNTER — HOSPITAL ENCOUNTER (OUTPATIENT)
Dept: MAMMOGRAPHY | Facility: HOSPITAL | Age: 80
Discharge: HOME | End: 2025-03-28
Admitting: NURSE PRACTITIONER
Payer: MEDICARE

## 2025-03-28 DIAGNOSIS — Z12.31 SCREENING MAMMOGRAM FOR BREAST CANCER: ICD-10-CM

## 2025-03-28 PROCEDURE — 77067 SCR MAMMO BI INCL CAD: CPT

## 2025-03-28 PROCEDURE — 77063 BREAST TOMOSYNTHESIS BI: CPT

## 2025-03-28 NOTE — LETTER
Denisse Marino  [address] [address]    April 2, 2025     Dear Ms. Marino:    Below are the results from your recent visit:    Resulted Orders   Mammo Screening Digital Tomosynthesis Bilateral With CAD    Narrative    MAMMO SCREENING DIGITAL TOMOSYNTHESIS BILATERAL W CAD-     CLINICAL INDICATION: 79 years old female presents for annual screening  mammogram.        TECHNIQUE: 2-D and tomosynthesis MLO and CC views of the breast(s) were  obtained     FINDINGS:     BREAST  DENSITY:  The breasts are heterogeneously dense, which may  obscure small masses.     There are no suspicious masses, calcifications, or areas of  architectural distortion.     IMPRESSION/RECOMMENDATION(S):     Negative mammogram showing no change from 12/30/2022 or 9/8/2017 or  4/21/2016.     Recommend annual screening mammogram in one year.     BI-RADS Category 1: Negative            This report was finalized on 3/28/2025 9:34 AM by Dr. Nick Rubin M.D  on Workstation: [id number]          Mammogram negative-  repeat in 1 year          If you have any questions or concerns, please don't hesitate to call.         Sincerely,        ANISH Christensen

## 2025-06-02 ENCOUNTER — TELEPHONE (OUTPATIENT)
Dept: FAMILY MEDICINE CLINIC | Facility: CLINIC | Age: 80
End: 2025-06-02
Payer: COMMERCIAL

## 2025-06-02 NOTE — TELEPHONE ENCOUNTER
Caller: Denisse Marino    Relationship to patient: Self    Best call back number: [phone]      Patient is needing: [facility] #[id number] [facility] [address] [facility] [address] [place] [address] & [place] [address] - [phone]  - [phone] FX IS GOING TO BE SENDING OVER A LETTER TO GET APPROVAL FOR HER TO FILL MUSCLE RELAXERS.  IT WAS PRESCRIBED BY HER DENTIST, DR. HENAO MASTERS TODAY.

## 2025-06-03 NOTE — TELEPHONE ENCOUNTER
Spoke with patient on yesterday to inform her that her message was sent to her provider and her provider has requested that she schedule an appt. For medication request. Patient stated that she doesn't understand why she has to schedule an appt when her dentist is the person that prescribed the medication. Patient was advised to contact her dentist regarding medication or schedule an appt. Patient stated that she would reach out to her dentist